# Patient Record
Sex: FEMALE | Race: WHITE | Employment: UNEMPLOYED | URBAN - METROPOLITAN AREA
[De-identification: names, ages, dates, MRNs, and addresses within clinical notes are randomized per-mention and may not be internally consistent; named-entity substitution may affect disease eponyms.]

---

## 2018-03-19 ENCOUNTER — HOSPITAL ENCOUNTER (OUTPATIENT)
Dept: GENERAL RADIOLOGY | Age: 58
Discharge: HOME | End: 2018-03-19

## 2018-03-19 DIAGNOSIS — M54.50 CHRONIC BILATERAL LOW BACK PAIN WITHOUT SCIATICA: ICD-10-CM

## 2018-03-19 DIAGNOSIS — G89.29 CHRONIC BILATERAL LOW BACK PAIN WITHOUT SCIATICA: ICD-10-CM

## 2018-03-19 DIAGNOSIS — M54.6 CHRONIC BILATERAL THORACIC BACK PAIN: ICD-10-CM

## 2018-03-19 DIAGNOSIS — G89.29 CHRONIC BILATERAL THORACIC BACK PAIN: ICD-10-CM

## 2018-04-13 ENCOUNTER — HOSPITAL ENCOUNTER (OUTPATIENT)
Dept: MAMMOGRAPHY | Age: 58
Discharge: HOME | End: 2018-04-13
Attending: FAMILY MEDICINE | Admitting: FAMILY MEDICINE

## 2018-04-13 DIAGNOSIS — Z12.31 VISIT FOR SCREENING MAMMOGRAM: ICD-10-CM

## 2018-05-29 ENCOUNTER — OFFICE VISIT (OUTPATIENT)
Dept: ORTHOPEDIC SURGERY | Age: 58
End: 2018-05-29

## 2018-05-29 VITALS
HEIGHT: 62 IN | DIASTOLIC BLOOD PRESSURE: 70 MMHG | BODY MASS INDEX: 24.83 KG/M2 | WEIGHT: 134.92 LBS | SYSTOLIC BLOOD PRESSURE: 150 MMHG

## 2018-05-29 DIAGNOSIS — M51.36 DDD (DEGENERATIVE DISC DISEASE), LUMBAR: ICD-10-CM

## 2018-05-29 DIAGNOSIS — R20.2 PARESTHESIA AND PAIN OF BOTH UPPER EXTREMITIES: ICD-10-CM

## 2018-05-29 DIAGNOSIS — M48.062 LUMBAR STENOSIS WITH NEUROGENIC CLAUDICATION: Primary | ICD-10-CM

## 2018-05-29 DIAGNOSIS — M48.061 LUMBAR FORAMINAL STENOSIS: ICD-10-CM

## 2018-05-29 DIAGNOSIS — M79.601 PARESTHESIA AND PAIN OF BOTH UPPER EXTREMITIES: ICD-10-CM

## 2018-05-29 DIAGNOSIS — M79.602 PARESTHESIA AND PAIN OF BOTH UPPER EXTREMITIES: ICD-10-CM

## 2018-05-29 PROCEDURE — G8427 DOCREV CUR MEDS BY ELIG CLIN: HCPCS | Performed by: PHYSICAL MEDICINE & REHABILITATION

## 2018-05-29 PROCEDURE — 99243 OFF/OP CNSLTJ NEW/EST LOW 30: CPT | Performed by: PHYSICAL MEDICINE & REHABILITATION

## 2018-05-29 PROCEDURE — 3017F COLORECTAL CA SCREEN DOC REV: CPT | Performed by: PHYSICAL MEDICINE & REHABILITATION

## 2018-05-29 PROCEDURE — G8420 CALC BMI NORM PARAMETERS: HCPCS | Performed by: PHYSICAL MEDICINE & REHABILITATION

## 2018-05-29 RX ORDER — METHOCARBAMOL 500 MG/1
TABLET, FILM COATED ORAL
Status: ON HOLD | COMMUNITY
Start: 2018-05-17 | End: 2018-08-14

## 2018-05-29 RX ORDER — LISINOPRIL 10 MG/1
TABLET ORAL
Status: ON HOLD | COMMUNITY
Start: 2018-05-08 | End: 2018-08-14

## 2018-05-29 RX ORDER — ATORVASTATIN CALCIUM 20 MG/1
TABLET, FILM COATED ORAL
Status: ON HOLD | COMMUNITY
Start: 2018-05-08 | End: 2018-08-14

## 2018-05-29 RX ORDER — FLUTICASONE PROPIONATE 50 MCG
2 SPRAY, SUSPENSION (ML) NASAL
COMMUNITY
Start: 2018-05-08 | End: 2020-07-22 | Stop reason: ALTCHOICE

## 2018-05-29 RX ORDER — HYDROCHLOROTHIAZIDE 12.5 MG/1
12.5 TABLET ORAL DAILY
COMMUNITY
Start: 2018-05-08

## 2018-05-29 RX ORDER — MOMETASONE FUROATE 100 UG/1
AEROSOL RESPIRATORY (INHALATION)
Status: ON HOLD | COMMUNITY
Start: 2018-05-08 | End: 2018-10-30 | Stop reason: ALTCHOICE

## 2018-05-29 RX ORDER — AMITRIPTYLINE HYDROCHLORIDE 50 MG/1
50 TABLET, FILM COATED ORAL NIGHTLY
COMMUNITY
Start: 2018-05-17 | End: 2020-07-22 | Stop reason: ALTCHOICE

## 2018-05-29 RX ORDER — CETIRIZINE HYDROCHLORIDE 10 MG/1
10 TABLET ORAL NIGHTLY
COMMUNITY
Start: 2018-05-08

## 2018-05-30 ENCOUNTER — TELEPHONE (OUTPATIENT)
Dept: ORTHOPEDIC SURGERY | Age: 58
End: 2018-05-30

## 2018-06-07 ENCOUNTER — TELEPHONE (OUTPATIENT)
Dept: ORTHOPEDIC SURGERY | Age: 58
End: 2018-06-07

## 2018-06-15 ENCOUNTER — OFFICE VISIT (OUTPATIENT)
Dept: ORTHOPEDIC SURGERY | Age: 58
End: 2018-06-15

## 2018-06-15 DIAGNOSIS — G56.03 BILATERAL CARPAL TUNNEL SYNDROME: Primary | ICD-10-CM

## 2018-06-15 PROCEDURE — 95910 NRV CNDJ TEST 7-8 STUDIES: CPT | Performed by: PHYSICAL MEDICINE & REHABILITATION

## 2018-06-15 PROCEDURE — 95886 MUSC TEST DONE W/N TEST COMP: CPT | Performed by: PHYSICAL MEDICINE & REHABILITATION

## 2018-06-19 ENCOUNTER — TELEPHONE (OUTPATIENT)
Dept: ORTHOPEDIC SURGERY | Age: 58
End: 2018-06-19

## 2018-06-19 ENCOUNTER — OFFICE VISIT (OUTPATIENT)
Dept: ORTHOPEDIC SURGERY | Age: 58
End: 2018-06-19

## 2018-06-19 VITALS
HEIGHT: 62 IN | DIASTOLIC BLOOD PRESSURE: 72 MMHG | WEIGHT: 180 LBS | SYSTOLIC BLOOD PRESSURE: 130 MMHG | BODY MASS INDEX: 33.13 KG/M2

## 2018-06-19 DIAGNOSIS — M51.26 LUMBAR DISC HERNIATION: Primary | ICD-10-CM

## 2018-06-19 DIAGNOSIS — M54.16 LUMBAR RADICULOPATHY: ICD-10-CM

## 2018-06-19 DIAGNOSIS — G56.01 RIGHT CARPAL TUNNEL SYNDROME: ICD-10-CM

## 2018-06-19 PROCEDURE — G8417 CALC BMI ABV UP PARAM F/U: HCPCS | Performed by: PHYSICAL MEDICINE & REHABILITATION

## 2018-06-19 PROCEDURE — 1036F TOBACCO NON-USER: CPT | Performed by: PHYSICAL MEDICINE & REHABILITATION

## 2018-06-19 PROCEDURE — G8427 DOCREV CUR MEDS BY ELIG CLIN: HCPCS | Performed by: PHYSICAL MEDICINE & REHABILITATION

## 2018-06-19 PROCEDURE — 99213 OFFICE O/P EST LOW 20 MIN: CPT | Performed by: PHYSICAL MEDICINE & REHABILITATION

## 2018-06-19 PROCEDURE — 3017F COLORECTAL CA SCREEN DOC REV: CPT | Performed by: PHYSICAL MEDICINE & REHABILITATION

## 2018-07-16 ENCOUNTER — HOSPITAL ENCOUNTER (OUTPATIENT)
Dept: PHYSICAL THERAPY | Age: 58
Setting detail: THERAPIES SERIES
Discharge: HOME | End: 2018-07-16
Payer: COMMERCIAL

## 2018-07-16 PROCEDURE — G8978 MOBILITY CURRENT STATUS: HCPCS | Performed by: PHYSICAL THERAPIST

## 2018-07-16 PROCEDURE — G8979 MOBILITY GOAL STATUS: HCPCS | Performed by: PHYSICAL THERAPIST

## 2018-07-16 PROCEDURE — 97110 THERAPEUTIC EXERCISES: CPT | Performed by: PHYSICAL THERAPIST

## 2018-07-16 PROCEDURE — 97161 PT EVAL LOW COMPLEX 20 MIN: CPT | Performed by: PHYSICAL THERAPIST

## 2018-07-16 NOTE — FLOWSHEET NOTE
stated goal: walk for ex without pain     Therapist goals for Patient:   Short Term Goals: To be achieved in: 2 weeks  1. Independent in HEP and progression per patient tolerance, in order to prevent re-injury. 2. Patient will have a decrease in pain to facilitate improvement in movement, function, and ADLs as indicated by Functional Deficits.     Long Term Goals: To be achieved in: 12 weeks  1. Disability index score of 19% or less for the LITTLE to assist with reaching prior level of function. 2. Patient will demonstrate increased AROM to WNL, good LS mobility, good hip ROM to allow for proper joint functioning as indicated by patients Functional Deficits. 3. Patient will demonstrate an increase in Strength to good proximal hip and core activation to allow for proper functional mobility as indicated by patients Functional Deficits. 4. Patient will return to full functional activities without increased symptoms or restriction. 5. Walk 30 min without increased pain 90% of time(patient specific functional goal)                   Progression Towards Functional goals:  [] Patient is progressing as expected towards functional goals listed. [] Progression is slowed due to complexities listed. [] Progression has been slowed due to co-morbidities.   [x] Plan just implemented, too soon to assess goals progression  [] Other:     ASSESSMENT:  See eval    Treatment/Activity Tolerance:  [x] Patient tolerated treatment well [] Patient limited by fatique  [] Patient limited by pain  [] Patient limited by other medical complications  [] Other:     Prognosis: [x] Good [] Fair  [] Poor    Patient Requires Follow-up: [x] Yes  [] No    PLAN: See eval  [] Continue per plan of care [] Alter current plan (see comments)  [x] Plan of care initiated [] Hold pending MD visit [] Discharge    Electronically signed by: Hamilton Jones PT

## 2018-07-16 NOTE — PLAN OF CARE
[address] [place] [address],[address] [facility] [address] [place] [address], [address] [place] [address] [address]  Phone: [phone]   Fax:     [phone]     Carmina Salvador    Dear  Dr Lisandro Simmons,    We had the pleasure of evaluating the following patient for physical therapy services at [address] [place] [address]. A summary of our findings can be found in the initial assessment below. This includes our plan of care. If you have any questions or concerns regarding these findings, please do not hesitate to contact me at the office phone number checked above. Thank you for the referral.       Physician Signature:_______________________________Date:__________________  By signing above (or electronic signature), therapists plan is approved by physician      Patient: Rodrigo Buenrostro   : 1960   MRN: [id number]  Referring Physician:  Lisandro Simmons      Evaluation Date: 2018      Medical Diagnosis Information:   M51.26 lumbar herniation,  M54.16 lumbar radic     M54.5 LBP                                        Insurance information:  [facility] comm     Precautions/ Contra-indications: none  Latex Allergy:  [x]NO      []YES  Preferred Language for Healthcare:   [x]English       []other:    SUBJECTIVE: Patient stated complaint:L4-5 herniated pinching her sciatica. Going to have injections as soon as she gets the appt scheduled. Insidious LBP about 6 mos ago and has good and bad days. Relevant Medical History:  Functional Disability Index/G-Codes:  PT G-Codes  Functional Assessment Tool Used: LITTLE  Score: 40%  Functional Limitation: Mobility: Walking and moving around  Mobility: Walking and Moving Around Current Status (): At least 40 percent but less than 60 percent impaired, limited or restricted  Mobility: Walking and Moving Around Goal Status ():  At least 1 percent but less than 20 percent impaired, limited or restricted    Pain Scale: 6/10  Easing factors: nothing  Provocative factors: everything     Type: [x]Constant   []Intermittent  []Radiating []Localized []other:     Numbness/Tingling: N/T both feet    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    Living Status/Prior Level of Function: Independent with ADLs and IADLs,     OBJECTIVE:     ROM  Comments   Lumbar Flex     Lumbar Ext       MMT LEFT RIGHT Comments   Lumbar Side Bend      Lumbar Rotation      Hip Flexion 5/5 4/5 pain    Hip Abd      Hip ER      Hip IR      Hip Extension      Knee Ext 5/5 5/5    Knee Flex 5/5 5/5    Hamstring Flex 5/5 5/5    Piriformis                    ROM LEFT RIGHT Comments   Multifidus      Transverse Ab      Hip Flexors      Hip Abductors      Hip Extensors      Hip IR equal              Myotomes Normal Abnormal Comments   Hip flexion (L1-L2)      Knee extension (L2-L4)      Dorsiflexion (L4-L5)      Great Toe Ext (L5)      Ankle Eversion (S1-S2)      Ankle PF(S1-S2)        Dermatomes Normal Abnormal Comments   inguinal area (L1)       anterior mid-thigh (L2)      distal ant thigh/med knee (L3)      medial lower leg and foot (L4)      lateral lower leg and foot (L5)      posterior calf (S1)      medial calcaneus (S2)        Neural dynamic tension testing Normal Abnormal Comments   Slump Test  - Degree of knee flexion:       SLR       0-30      30-70      Femoral nerve (L2-4)        Reflexes Normal Abnormal Comments   S1-2 Seated achilles x     S1-2 Prone knee bend      L3-4 Patellar tendon x     C5-6 Biceps      C6 Brachioradialis      C7-8 Triceps      Clonus      Babinski      Wagner's        Joint mobility:    []Normal    []Hypo   []Hyper    Palpation:     Functional Mobility/Transfers: independent     Posture: good    Gait: (include devices/WB status) normal    Bandages/Dressings/Incisions: NA    Orthopedic Special Tests:    Normal Abnormal N/A Comments   Toe walk         Heel Walk       Fwd Bend-aberrant or innominate mvmt)       Trendelenburg       Kemps/Quadrant       Ingridk       SALLIE/Jose       Hip scour       SLR       Crossed SLR       Supine to sit       Hip thrust       SI distraction/compression       PA/Spring       Prone Instability test       Prone knee bend       Sacral Spring/thrust                  [x] Patient history, allergies, meds reviewed. Medical chart reviewed. See intake form. Review Of Systems (ROS):  [x]Performed Review of systems (Integumentary, CardioPulmonary, Neurological) by intake and observation. Intake form has been scanned into medical record. Patient has been instructed to contact their primary care physician regarding ROS issues if not already being addressed at this time.       Co-morbidities/Complexities (which will affect course of rehabilitation):   [x]None           Arthritic conditions   []Rheumatoid arthritis (M05.9)  []Osteoarthritis (M19.91)   Cardiovascular conditions   []Hypertension (I10)  []Hyperlipidemia (E78.5)  []Angina pectoris (I20)  []Atherosclerosis (I70)   Musculoskeletal conditions   []Disc pathology   []Congenital spine pathologies   []Prior surgical intervention  []Osteoporosis (M81.8)  []Osteopenia (M85.8)   Endocrine conditions   []Hypothyroid (E03.9)  []Hyperthyroid Gastrointestinal conditions   []Constipation (N66.10)   Metabolic conditions   []Morbid obesity (E66.01)  []Diabetes type 1(E10.65) or 2 (E11.65)   []Neuropathy (G60.9)     Pulmonary conditions   []Asthma (J45)  []Coughing   []COPD (J44.9)   Psychological Disorders  []Anxiety (F41.9)  []Depression (F32.9)   []Other:   []Other:           Barriers to/and or personal factors that will affect rehab potential:              []Age  []Sex              []Motivation/Lack of Motivation                        []Co-Morbidities              []Cognitive Function, education/learning barriers              []Environmental, home barriers              []profession/work barriers  []past PT/medical management activities   []Reduced participation in work activities   [x]Reduced participation in social activities. [x]Reduced participation in sport/recreational activities. Classification:   [x]Signs/symptoms consistent with Lumbar instability/stabilization subgroup. []Signs/symptoms consistent with Lumbar mobilization/manipulation subgroup, myotomes and dermatomes intact. Meets manipulation criteria. []Signs/symptoms consistent with Lumbar direction specific/centralization subgroup   []Signs/symptoms consistent with Lumbar traction subgroup     []Signs/symptoms consistent with lumbar facet dysfunction   []Signs/symptoms consistent with lumbar stenosis type dysfunction   [x]Signs/symptoms consistent with nerve root involvement including myotome & dermatome dysfunction   []Signs/symptoms consistent with post-surgical status including: decreased ROM, strength and function.    []signs/symptoms consistent with pathology which may benefit from Dry needling     []other:      Prognosis/Rehab Potential:      []Excellent   [x]Good    []Fair   []Poor    Tolerance of evaluation/treatment:    []Excellent   [x]Good    []Fair   []Poor     Physical Therapy Evaluation Complexity Justification  [] A history of present problem with:  [x] no personal factors and/or comorbidities that impact the plan of care;  []1-2 personal factors and/or comorbidities that impact the plan of care  []3 personal factors and/or comorbidities that impact the plan of care  [] An examination of body systems using standardized tests and measures addressing any of the following: body structures and functions (impairments), activity limitations, and/or participation restrictions;:  [] a total of 1-2 or more elements   [x] a total of 3 or more elements   [] a total of 4 or more elements   [] A clinical presentation with:  [x] stable and/or uncomplicated characteristics   [] evolving clinical presentation with changing characteristics  [] unstable proper functional mobility as indicated by patients Functional Deficits. 4. Patient will return to full functional activities without increased symptoms or restriction.    5. Walk 30 min without increased pain 90% of time(patient specific functional goal)       Electronically signed by:  Mar Bryant PT

## 2018-07-23 ENCOUNTER — HOSPITAL ENCOUNTER (OUTPATIENT)
Dept: PHYSICAL THERAPY | Age: 58
Setting detail: THERAPIES SERIES
Discharge: HOME | End: 2018-07-23
Payer: COMMERCIAL

## 2018-07-23 PROCEDURE — 97110 THERAPEUTIC EXERCISES: CPT

## 2018-07-30 ENCOUNTER — APPOINTMENT (OUTPATIENT)
Dept: PHYSICAL THERAPY | Age: 58
End: 2018-07-30
Payer: COMMERCIAL

## 2018-08-03 ENCOUNTER — TELEPHONE (OUTPATIENT)
Dept: ORTHOPEDIC SURGERY | Age: 58
End: 2018-08-03

## 2018-08-14 ENCOUNTER — HOSPITAL ENCOUNTER (OUTPATIENT)
Age: 58
Setting detail: OUTPATIENT SURGERY
Discharge: HOME | End: 2018-08-14
Attending: PHYSICAL MEDICINE & REHABILITATION | Admitting: PHYSICAL MEDICINE & REHABILITATION
Payer: COMMERCIAL

## 2018-08-14 VITALS
TEMPERATURE: 97.1 F | WEIGHT: 185 LBS | SYSTOLIC BLOOD PRESSURE: 126 MMHG | DIASTOLIC BLOOD PRESSURE: 77 MMHG | OXYGEN SATURATION: 99 % | RESPIRATION RATE: 16 BRPM | BODY MASS INDEX: 34.04 KG/M2 | HEART RATE: 67 BPM | HEIGHT: 62 IN

## 2018-08-14 PROCEDURE — 2709999900 HC NON-CHARGEABLE SUPPLY: Performed by: PHYSICAL MEDICINE & REHABILITATION

## 2018-08-14 PROCEDURE — 3600000002 HC SURGERY LEVEL 2 BASE: Performed by: PHYSICAL MEDICINE & REHABILITATION

## 2018-08-14 PROCEDURE — 7100000010 HC PHASE II RECOVERY - FIRST 15 MIN: Performed by: PHYSICAL MEDICINE & REHABILITATION

## 2018-08-14 RX ORDER — CYCLOBENZAPRINE HCL 10 MG
10 TABLET ORAL 3 TIMES DAILY PRN
Status: ON HOLD | COMMUNITY
End: 2018-10-30 | Stop reason: ALTCHOICE

## 2018-08-14 ASSESSMENT — PAIN SCALES - GENERAL
PAINLEVEL_OUTOF10: 0
PAINLEVEL_OUTOF10: 0

## 2018-08-14 ASSESSMENT — PAIN - FUNCTIONAL ASSESSMENT: PAIN_FUNCTIONAL_ASSESSMENT: 0-10

## 2018-08-14 NOTE — H&P
HISTORY AND PHYSICAL/PRE-SEDATION ASSESSMENT    Patient:  Luci Uriostegui   :  1960  Medical Record No.:  [id number]   Date:  2018  Physician:  Salvatore Heath M.D. Facility: [facility]     Nursing History and Physical reviewed and agreed upon. Additional findings:    Allergies:  Patient has no known allergies. Home Medications:    Prior to Admission medications    Medication Sig Start Date End Date Taking? Authorizing Provider   cyclobenzaprine (FLEXERIL) 10 MG tablet Take 10 mg by mouth 3 times daily as needed for Muscle spasms   Yes Historical Provider, MD   Naproxen Sodium (ALEVE PO) Take by mouth   Yes Historical Provider, MD   hydrochlorothiazide (HYDRODIURIL) 12.5 MG tablet  18  Yes Historical Provider, MD   fluticasone (FLONASE) 50 MCG/ACT nasal spray  18  Yes Historical Provider, MD   amitriptyline (ELAVIL) 50 MG tablet  18  Yes Historical Provider, MD   cetirizine (ZYRTEC) 10 MG tablet  18  Yes Historical Provider, MD   [facility]  MCG/ACT AERO inhaler  18  Yes Historical Provider, MD       Vitals: Stable       PHYSICAL EXAM:  HENT: Airway patent and reviewed  Cardiovascular: Normal rate, regular rhythm, normal heart sounds. Pulmonary/Chest: No wheezes. No rhonchi. No rales. Abdominal: Soft. Bowel sounds are normal. No distension. ASA CLASS:         []   I. Normal, healthy adult           [x]   II.  Mild systemic disease            []   III. Severe systemic disease      Sedation plan:   [x]  Local              []  Minimal                  []  General anesthesia    Patient's condition acceptable for planned procedure/sedation. Post Procedure Plan   Return to same level of care   ______________________     The risks and benefits as well as alternatives to the procedure have been discussed with the patient and or family. The patient and or next of kin understands and agrees to proceed.     Salvatore Heath M.D.

## 2018-09-11 ENCOUNTER — OFFICE VISIT (OUTPATIENT)
Dept: ORTHOPEDIC SURGERY | Age: 58
End: 2018-09-11

## 2018-09-11 ENCOUNTER — TELEPHONE (OUTPATIENT)
Dept: ORTHOPEDIC SURGERY | Age: 58
End: 2018-09-11

## 2018-09-11 VITALS
HEIGHT: 63 IN | WEIGHT: 180 LBS | SYSTOLIC BLOOD PRESSURE: 126 MMHG | DIASTOLIC BLOOD PRESSURE: 75 MMHG | BODY MASS INDEX: 31.89 KG/M2

## 2018-09-11 DIAGNOSIS — M54.16 LUMBAR RADICULOPATHY: ICD-10-CM

## 2018-09-11 DIAGNOSIS — M51.26 HNP (HERNIATED NUCLEUS PULPOSUS), LUMBAR: Primary | ICD-10-CM

## 2018-09-11 DIAGNOSIS — M48.061 LUMBAR FORAMINAL STENOSIS: ICD-10-CM

## 2018-09-11 PROCEDURE — 1036F TOBACCO NON-USER: CPT | Performed by: PHYSICAL MEDICINE & REHABILITATION

## 2018-09-11 PROCEDURE — 3017F COLORECTAL CA SCREEN DOC REV: CPT | Performed by: PHYSICAL MEDICINE & REHABILITATION

## 2018-09-11 PROCEDURE — 99214 OFFICE O/P EST MOD 30 MIN: CPT | Performed by: PHYSICAL MEDICINE & REHABILITATION

## 2018-09-11 PROCEDURE — G8417 CALC BMI ABV UP PARAM F/U: HCPCS | Performed by: PHYSICAL MEDICINE & REHABILITATION

## 2018-09-11 PROCEDURE — G8427 DOCREV CUR MEDS BY ELIG CLIN: HCPCS | Performed by: PHYSICAL MEDICINE & REHABILITATION

## 2018-09-11 RX ORDER — METHYLPREDNISOLONE 4 MG/1
TABLET ORAL
Qty: 1 KIT | Refills: 0 | Status: SHIPPED | OUTPATIENT
Start: 2018-09-11 | End: 2018-09-17

## 2018-09-11 NOTE — PROGRESS NOTES
Follow up: [address] [place] [address]  1960  [id number]      CHIEF COMPLAINT:    Chief Complaint   Patient presents with    Lower Back Pain     f/u Right L5 & Left L5 TF 08/14/18, states she had complete pain relief x3 weeks, her pain has flared up over the last week on the left side          HISTORY OF PRESENT ILLNESS:  Ms. Rebel Ortega is a 56 y.o. female returns for a follow up visit for multiple medical problems. Her current presenting problems are   1. HNP (herniated nucleus pulposus), lumbar    2. Lumbar foraminal stenosis    3. Lumbar radiculopathy    . As per information/history obtained from the PADT(patient assessment and documentation tool) - She complains of pain in the lower back with radiation to the upper leg Left She rates the pain 6/10 and describes it as dull, aching. Pain is made worse by: nothing. She denies side effects from the current pain regimen. Patient reports that since the last follow up visit the physical functioning is unchanged, family/social relationships are unchanged, mood is unchanged and sleep patterns are unchanged, and that the overall functioning is unchanged. Patient denies neurological bowel or bladder. She returns after undergoing bilateral L5 transforaminal epidural steroid injections about 1 month ago. This gave her about 3 weeks of pain relief. She states that she has been increasing her activity quite a bit. She states she has completed things she hasn't done in years. Since last one week she has developed left leg pain radiating from the hip into the ankle. She finds it difficult to stand and walk due to her left leg pain. She would like to repeat an epidural steroid injection.   She is only taking anti-inflammatories at this point        Associated signs and symptoms:   Neurogenic bowel or bladder symptoms:  no   Perceived weakness:  yes   Difficulty walking:  yes              Past Medical History:   Past Medical History:   Diagnosis Date    Depression       Past Surgical History:     Past Surgical History:   Procedure Laterality Date     SECTION      HYSTERECTOMY      GA NJX DX/THER SBST INTRLMNR CRV/THRC W/IMG GDN Bilateral 2018    BILATERAL LUMBAR FIVE TRANSFORAMINAL EPIDURAL STEROID INJECTION SITE CONFIRMED BY FLUOROSCOPY performed by Abbie Mcgraw MD at [place] [address]     Current Medications:     Current Outpatient Prescriptions:     cyclobenzaprine (FLEXERIL) 10 MG tablet, Take 10 mg by mouth 3 times daily as needed for Muscle spasms, Disp: , Rfl:     Naproxen Sodium (ALEVE PO), Take by mouth, Disp: , Rfl:     hydrochlorothiazide (HYDRODIURIL) 12.5 MG tablet, , Disp: , Rfl:     fluticasone (FLONASE) 50 MCG/ACT nasal spray, , Disp: , Rfl:     amitriptyline (ELAVIL) 50 MG tablet, , Disp: , Rfl:     cetirizine (ZYRTEC) 10 MG tablet, , Disp: , Rfl:     ASMANEX  MCG/ACT AERO inhaler, , Disp: , Rfl:   Allergies:  Patient has no known allergies. Social History:    reports that she has quit smoking. She has never used smokeless tobacco. She reports that she does not drink alcohol or use drugs. Family History:   History reviewed. No pertinent family history. REVIEW OF SYSTEMS:   CONSTITUTIONAL: Denies unexplained weight loss, fevers, chills or fatigue  NEUROLOGICAL: Denies unsteady gait or progressive weakness  MUSCULOSKELETAL: Denies joint swelling or redness  GI: Denies nausea, vomiting, diarrhea   : Denies bowel or bladder issues       PHYSICAL EXAM:    Vitals: Blood pressure 126/75, height 5' 3\" (1.6 m), weight 180 lb (81.6 kg), not currently breastfeeding. GENERAL EXAM:  · General Apparence: Patient is adequately groomed with no evidence of malnutrition. · Psychiatric: Orientation: The patient is oriented to time, place and person. The patient's mood and affect are appropriate   · Vascular: Examination reveals no swelling and palpation reveals no tenderness in upper or lower extremities.  Good capillary refill. · The lymphatic examination of the neck, axillae and groin reveals all areas to be without enlargement or induration  · Sensation is intact without deficit in the upper and lower extremities to light touch and pinprick  · Coordination of the upper and lower extremities are normal.  · Additional Examinations:  · RIGHT UPPER EXTREMITY:  Inspection/examination of the right upper extremity does not show any tenderness, deformity or injury. Range of motion is unremarkable and pain-free. There is no gross instability. There are no rashes, ulcerations or lesions. Strength and tone are normal. No atrophy or abnormal movements are noted. · LEFT UPPER EXTREMITY: Inspection/examination of the left upper extremity does not show any tenderness, deformity or injury. Range of motion is unremarkable and pain-free. There is no gross instability. There are no rashes, ulcerations or lesions. Strength and tone are normal. No atrophy or abnormal movements are noted. LUMBAR/SACRAL EXAMINATION:  · Inspection: Local inspection shows no step-off or bruising. Lumbar alignment is normal. No instability is noted. · Palpation:   No evidence of tenderness at the midline. Positive for tenderness palpation over the left lumbar paraspinals. There is no paraspinal spasm. · Range of Motion: limited by 50% in all planes due to pain  · Strength:   Strength testing is 5/5 in all muscle groups tested. · Special Tests:   Straight leg raise + on left and crossed SLR negative. · Skin: There are no rashes, ulcerations or lesions. · Reflexes: Reflexes are symmetrically 2+ at the patellar and ankle tendons. Clonus absent bilaterally at the feet. · Gait & station: antalgic on the left  · Additional Examinations:  · RIGHT LOWER EXTREMITY: Inspection/examination of the right lower extremity does not show any tenderness, deformity or injury. Range of motion is normal and pain-free. There is no gross instability.   There are no rashes, ulcerations or lesions. Strength and tone are normal. No atrophy or abnormal movements are noted. · LEFT LOWER EXTREMITY:  Inspection/examination of the left lower extremity does not show any tenderness, deformity or injury. Range of motion is normal and pain-free. There is no gross instability. There are no rashes, ulcerations or lesions. Strength and tone are normal. No atrophy or abnormal movements are noted. Diagnostic Testing:    MR Lumbar spine shows 6/4/18  1. Left paracentral/foraminal protrusion with mild resultant central canal stenosis L4-5 level.     Moderate left and mild right foraminal narrowing compresses the exiting L5 nerve root in    combination with facet arthropathy. 2. Mild central canal stenosis L3-4 level with inferior foraminal narrowing without nerve root    effacement. No results found for this or any previous visit. Impression:       1. HNP (herniated nucleus pulposus), lumbar    2. Lumbar foraminal stenosis    3. Lumbar radiculopathy        Plan:  Clinical Course: Above diagnoses are worsening    1. Medications:  I will prescribe a medrol dose pack to help with the inflammatory component of pain. Risks, benefits and alternatives were discussed. Recommended to stop any NSAIDs to reduce risk of GI bleed during the course of the dose pack. 2. PT:  Encouraged to continue with HEP. 3. Further studies:  No further studies. 4. Interventional:  We discussed pursuing a Left L4 and L5 TF epidural steroid injection to address the pain. Radiologic imaging and symptoms confirm the pain etiology. Risks, benefits and alternatives of interventional options were discussed. These include and are not limited to bleeding, infection, spinal headache, nerve injury and lack of pain relief. The patient verbalized understanding and would like to proceed. The patient will be scheduled accordingly. 5. Follow up:  4-6 weeks          Paola Meyers MD, [facility], [facility]  Board Certified in

## 2018-09-12 ENCOUNTER — TELEPHONE (OUTPATIENT)
Dept: ORTHOPEDIC SURGERY | Age: 58
End: 2018-09-12

## 2018-09-12 NOTE — TELEPHONE ENCOUNTER
Auth: WILLIAM  Reference #: [id number]  Date: 9/25/18  CPT: 66840, 80369, [id number], 12912  DX: M51.26, M99.83, M54.16   Outpatient  Procedure: TUAN  SX Location: [address]: [address] [place] [address]  Physician: NELSON

## 2018-09-20 ENCOUNTER — OFFICE VISIT (OUTPATIENT)
Dept: ORTHOPEDIC SURGERY | Age: 58
End: 2018-09-20

## 2018-09-20 ENCOUNTER — TELEPHONE (OUTPATIENT)
Dept: ORTHOPEDIC SURGERY | Age: 58
End: 2018-09-20

## 2018-09-20 VITALS
DIASTOLIC BLOOD PRESSURE: 75 MMHG | WEIGHT: 180 LBS | HEIGHT: 63 IN | SYSTOLIC BLOOD PRESSURE: 126 MMHG | BODY MASS INDEX: 31.89 KG/M2

## 2018-09-20 DIAGNOSIS — M51.26 HNP (HERNIATED NUCLEUS PULPOSUS), LUMBAR: Primary | ICD-10-CM

## 2018-09-20 DIAGNOSIS — R29.898 LEFT LEG WEAKNESS: ICD-10-CM

## 2018-09-20 DIAGNOSIS — M48.062 SPINAL STENOSIS OF LUMBAR REGION WITH NEUROGENIC CLAUDICATION: ICD-10-CM

## 2018-09-20 DIAGNOSIS — M51.36 DDD (DEGENERATIVE DISC DISEASE), LUMBAR: ICD-10-CM

## 2018-09-20 PROCEDURE — 3017F COLORECTAL CA SCREEN DOC REV: CPT | Performed by: PHYSICAL MEDICINE & REHABILITATION

## 2018-09-20 PROCEDURE — G8427 DOCREV CUR MEDS BY ELIG CLIN: HCPCS | Performed by: PHYSICAL MEDICINE & REHABILITATION

## 2018-09-20 PROCEDURE — 99214 OFFICE O/P EST MOD 30 MIN: CPT | Performed by: PHYSICAL MEDICINE & REHABILITATION

## 2018-09-20 PROCEDURE — 1036F TOBACCO NON-USER: CPT | Performed by: PHYSICAL MEDICINE & REHABILITATION

## 2018-09-20 PROCEDURE — G8417 CALC BMI ABV UP PARAM F/U: HCPCS | Performed by: PHYSICAL MEDICINE & REHABILITATION

## 2018-09-20 RX ORDER — OXYCODONE HYDROCHLORIDE AND ACETAMINOPHEN 5; 325 MG/1; MG/1
1 TABLET ORAL 3 TIMES DAILY PRN
Qty: 21 TABLET | Refills: 0 | Status: SHIPPED | OUTPATIENT
Start: 2018-09-21 | End: 2018-09-28

## 2018-09-20 RX ORDER — METHYLPREDNISOLONE 4 MG/1
TABLET ORAL
Qty: 1 KIT | Refills: 0 | Status: SHIPPED | OUTPATIENT
Start: 2018-09-20 | End: 2018-09-26

## 2018-09-20 NOTE — PROGRESS NOTES
Follow up: [address] [place] [address]  1960  [id number]      CHIEF COMPLAINT:    Chief Complaint   Patient presents with    Lower Back Pain     F/u LSP. Patient just had Right L5 & Left L5 TF 8/14. She is scheduled for another TUAN on 9/25.  Leg Pain     New onset bilateral leg pain that began Friday. No new injuries or triggers for pain. States pain radiates just below knees. HISTORY OF PRESENT ILLNESS:  Ms. Bk Phipps is a 56 y.o. female returns for a follow up visit for multiple medical problems. Her current presenting problems are   1. HNP (herniated nucleus pulposus), lumbar    2. Spinal stenosis of lumbar region with neurogenic claudication    3. DDD (degenerative disc disease), lumbar    4. Left leg weakness    . As per information/history obtained from the PADT(patient assessment and documentation tool) - She complains of pain in the lower back with radiation to the upper leg Bilateral and knees Bilateral She rates the pain 10/10 and describes it as dull, aching, throbbing. Pain is made worse by: walking, standing. She denies side effects from the current pain regimen. Patient reports that since the last follow up visit the physical functioning is worse, family/social relationships are worse, mood is worse and sleep patterns are worse, and that the overall functioning is worse. Patient denies neurological bowel or bladder. She presents today with a one-week history of increase of severe back and bilateral leg pain. She has had bilateral L5 transforaminal epidural steroid injections performed on 8/14/2018. She did quite well following these injections. Over the course of the past 1 week she cannot identify any inciting event or any injury. She feels weak in her left leg. She denies having any bowel bladder dysfunction which is new. She has had long-standing intermittent urinary incontinence due to previous hysterectomy.   She feels her left leg is weak compared to her right leg.  But she feels her pain is equally as bad in her back and both legs. Her pain radiates into her knees and ankles. She finds difficult to walk and sleep as well. She has been to the ER twice now and received Percocet. She is requesting a refill of her Percocet. Associated signs and symptoms:   Neurogenic bowel or bladder symptoms:  no   Perceived weakness:  yes   Difficulty walking:  yes              Past Medical History:   Past Medical History:   Diagnosis Date    Depression       Past Surgical History:     Past Surgical History:   Procedure Laterality Date     SECTION      HYSTERECTOMY      MT NJX DX/THER SBST INTRLMNR CRV/THRC W/IMG GDN Bilateral 2018    BILATERAL LUMBAR FIVE TRANSFORAMINAL EPIDURAL STEROID INJECTION SITE CONFIRMED BY FLUOROSCOPY performed by Flaquita Freitas MD at [place] [address]     Current Medications:     Current Outpatient Prescriptions:     [START ON 2018] oxyCODONE-acetaminophen (PERCOCET) 5-325 MG per tablet, Take 1 tablet by mouth 3 times daily as needed for Pain for up to 7 days. Tigist Maya Date: 18, Disp: 21 tablet, Rfl: 0    methylPREDNISolone (MEDROL, DARIEL,) 4 MG tablet, Take by mouth., Disp: 1 kit, Rfl: 0    cyclobenzaprine (FLEXERIL) 10 MG tablet, Take 10 mg by mouth 3 times daily as needed for Muscle spasms, Disp: , Rfl:     Naproxen Sodium (ALEVE PO), Take by mouth, Disp: , Rfl:     hydrochlorothiazide (HYDRODIURIL) 12.5 MG tablet, , Disp: , Rfl:     fluticasone (FLONASE) 50 MCG/ACT nasal spray, , Disp: , Rfl:     amitriptyline (ELAVIL) 50 MG tablet, , Disp: , Rfl:     cetirizine (ZYRTEC) 10 MG tablet, , Disp: , Rfl:     ASMANEX  MCG/ACT AERO inhaler, , Disp: , Rfl:   Allergies:  Patient has no known allergies. Social History:    reports that she has quit smoking. She has never used smokeless tobacco. She reports that she does not drink alcohol or use drugs. Family History:   History reviewed.  No pertinent family instability. There are no rashes, ulcerations or lesions. Strength and tone are normal. No atrophy or abnormal movements are noted. Diagnostic Testing:    MR Lumbar spine shows 6/4/18  1. Left paracentral/foraminal protrusion with mild resultant central canal stenosis L4-5 level.     Moderate left and mild right foraminal narrowing compresses the exiting L5 nerve root in    combination with facet arthropathy. 2. Mild central canal stenosis L3-4 level with inferior foraminal narrowing without nerve root    effacement. 3. Please refer to the body of the report for level by level disc space analysis. No results found for this or any previous visit. Impression:       1. HNP (herniated nucleus pulposus), lumbar    2. Spinal stenosis of lumbar region with neurogenic claudication    3. DDD (degenerative disc disease), lumbar    4. Left leg weakness        Plan:  Clinical Course: above diagnoses are worsening    1. Medications:  I will prescribe a medrol dose pack to help with the inflammatory component of pain. Risks, benefits and alternatives were discussed. Recommended to stop any NSAIDs to reduce risk of GI bleed during the course of the dose pack. OARRS reviewed and appropriate. Low risk on ORT. Continue current regimen of Percocet 5/325 mg po tid #21. Informed verbal consent was obtained. Goals of the current treatment regimen include: improvement in pain, improvement in overall in physical performance, ability to perform daily activities, work or disability, emotional distress, health care utilization and decreased medication consumption. Progress will be monitored towards achieving/maintaining therapeutic goals:    1. Improving perceived interference of pain with ADL's, ability to perform HEP, continue to improve in overall flexibility, ROM, strength and endurance, ability to do household activities indoor and/or outdoor, work and social/leisure activities.  Improve psychosocial and physical functioning. 2. Improving sleep to 6-7 hours a night. Improve mood/ anxiety and depression symptoms    3. Reduction of reliance on opioid analgesia/more appropriate opioid use. Utilization of adjuvant medications as appropriate. Risks and benefits of the medications and alternative treatments have been discussed with the patient. The patient was advised against drinking alcohol with the opioid pain medicines, advised against driving or handling machinery when starting or adjusting the dose of medicines, feeling groggy or drowsy, or if having any cognitive issues related to the current medications. The patient is fully aware of the risk of overdose and death, if medicines are misused and not taken as prescribed. If the patient develops new symptoms or if the symptoms worsen, the patient was told to call the office. 2. PT:  Hold on PT  3. Further studies:  MR Lumbar spine ASAP to evaluate for large disc herniation  4. Interventional:  Hold on lumbar TUAN  5. Follow up:  4-6 weeks          Paola Fatima MD, YUMI, [facility]  Board Certified in [address] [place] [address]  Certified and Fellowship Trained in [facility] ([facility])             This dictation was performed with a verbal recognition program [facility] ) and it was checked for errors. It is possible that there are still dictated errors within this office note. If so, please bring any errors to my attention for an addendum. All efforts were made to ensure that this office note is accurate.

## 2018-10-02 ENCOUNTER — TELEPHONE (OUTPATIENT)
Dept: ORTHOPEDIC SURGERY | Age: 58
End: 2018-10-02

## 2018-10-02 ENCOUNTER — OFFICE VISIT (OUTPATIENT)
Dept: ORTHOPEDIC SURGERY | Age: 58
End: 2018-10-02
Payer: COMMERCIAL

## 2018-10-02 VITALS — BODY MASS INDEX: 31.89 KG/M2 | HEIGHT: 63 IN | WEIGHT: 180 LBS

## 2018-10-02 DIAGNOSIS — M54.16 LUMBAR RADICULOPATHY: ICD-10-CM

## 2018-10-02 DIAGNOSIS — M47.816 SPONDYLOSIS OF LUMBAR REGION WITHOUT MYELOPATHY OR RADICULOPATHY: ICD-10-CM

## 2018-10-02 DIAGNOSIS — M51.26 HNP (HERNIATED NUCLEUS PULPOSUS), LUMBAR: Primary | ICD-10-CM

## 2018-10-02 PROCEDURE — G8417 CALC BMI ABV UP PARAM F/U: HCPCS | Performed by: PHYSICAL MEDICINE & REHABILITATION

## 2018-10-02 PROCEDURE — G8427 DOCREV CUR MEDS BY ELIG CLIN: HCPCS | Performed by: PHYSICAL MEDICINE & REHABILITATION

## 2018-10-02 PROCEDURE — 99214 OFFICE O/P EST MOD 30 MIN: CPT | Performed by: PHYSICAL MEDICINE & REHABILITATION

## 2018-10-02 PROCEDURE — 1036F TOBACCO NON-USER: CPT | Performed by: PHYSICAL MEDICINE & REHABILITATION

## 2018-10-02 PROCEDURE — 3017F COLORECTAL CA SCREEN DOC REV: CPT | Performed by: PHYSICAL MEDICINE & REHABILITATION

## 2018-10-02 PROCEDURE — G8484 FLU IMMUNIZE NO ADMIN: HCPCS | Performed by: PHYSICAL MEDICINE & REHABILITATION

## 2018-10-02 RX ORDER — OXYCODONE HYDROCHLORIDE AND ACETAMINOPHEN 5; 325 MG/1; MG/1
1 TABLET ORAL 3 TIMES DAILY PRN
Qty: 21 TABLET | Refills: 0 | Status: SHIPPED | OUTPATIENT
Start: 2018-10-02 | End: 2018-10-09

## 2018-10-02 NOTE — PROGRESS NOTES
Follow up: [address] [place] [address]  1960  [id number]      CHIEF COMPLAINT:    Chief Complaint   Patient presents with    Lower Back Pain     MRI LSP RESULTS         HISTORY OF PRESENT ILLNESS:  Ms. Conrado Padilla is a 56 y.o. female returns for a follow up visit for multiple medical problems. Her current presenting problems are   1. HNP (herniated nucleus pulposus), lumbar    2. Lumbar radiculopathy    3. Spondylosis of lumbar region without myelopathy or radiculopathy    . As per information/history obtained from the PADT(patient assessment and documentation tool) - She complains of pain in the lower back with radiation to the upper leg Left She rates the pain 8/10 and describes it as aching. Pain is made worse by: sitting, standing for long periods. She denies side effects from the current pain regimen. Patient reports that since the last follow up visit the physical functioning is unchanged, family/social relationships are unchanged, mood is unchanged and sleep patterns are unchanged, and that the overall functioning is unchanged. Patient denies neurological bowel or bladder. He presents today with worsening low back pain radiating to the bilateral lower extremities. She underwent an MRI of lumbar spine on 2018. She was given Percocet at her last evaluation and she is resting a refill. She reports her pain is quite severe and limits her ability to stand and walk. She underwent a lumbar epidural steroid injection in August with significant relief for a few weeks.             Associated signs and symptoms:   Neurogenic bowel or bladder symptoms:  no   Perceived weakness:  yes   Difficulty walking:  yes              Past Medical History:   Past Medical History:   Diagnosis Date    Depression       Past Surgical History:     Past Surgical History:   Procedure Laterality Date     SECTION      HYSTERECTOMY      RI NJX DX/THER SBST INTRLMNR CRV/THRC W/IMG GDN Bilateral 2018 machinery when starting or adjusting the dose of medicines, feeling groggy or drowsy, or if having any cognitive issues related to the current medications. The patient is fully aware of the risk of overdose and death, if medicines are misused and not taken as prescribed. If the patient develops new symptoms or if the symptoms worsen, the patient was told to call the office. RX Monitoring 11/4/2015   Documentation No signs of potential drug abuse or diversion identified. 2. PT:  Encouraged to continue with HEP. 3. Further studies:  Referral to spine surgery    4. Interventional:  We discussed pursuing a Right L3 and left L5 epidural steroid injection to address the pain. Radiologic imaging and symptoms confirm the pain etiology. Risks, benefits and alternatives of interventional options were discussed. These include and are not limited to bleeding, infection, spinal headache, nerve injury and lack of pain relief. The patient verbalized understanding and would like to proceed. The patient will be scheduled accordingly. 5. Follow up:  4-6 weeks      Patricia Cyr was instructed to call the office if her symptoms worsen or if new symptoms appear prior to the next scheduled visit. She is specifically instructed to contact the office between now & her scheduled appointment if she has concerns related to her condition or if she needs assistance in scheduling the above tests. She is welcome to call for an appointment sooner if she has any additional concerns or questions. Александр Vásquez. Gavin Sanchez MD, YUMI, [facility]  Board Certified in [address] [place] [address] Certified and Fellowship Trained in [facility] ([facility])             This dictation was performed with a verbal recognition program [facility]) and it was checked for errors. It is possible that there are still dictated errors within this office note.  If so, please bring

## 2018-10-02 NOTE — LETTER
Please schedule the following with:     Date:   1.10/16/2018 @ 7:45    2. 10/30/2018 @ 7:30     Account: [id number]  Patient: Marleni Blue    : 1960  Address:  [address]  [place]    Phone (H):  [phone] (home)      ----------------------------------------------------------------------------------------------  Diagnosis:     ICD-10-CM ICD-9-CM    1. HNP (herniated nucleus pulposus), lumbar M51.26 722.10 oxyCODONE-acetaminophen (PERCOCET) 5-325 MG per tablet      Ana Marcus MD (spine)   2. Lumbar radiculopathy M54.16 724.4 oxyCODONE-acetaminophen (PERCOCET) 5-325 MG per tablet      Ana Marcus MD (spine)   3. Spondylosis of lumbar region without myelopathy or radiculopathy M47.816 721.3 oxyCODONE-acetaminophen (PERCOCET) 5-325 MG per tablet      Ana Marcus MD (spine)         Levels: L3 RIGHT    L5 LEFT  Transforaminal TUAN  CPT Codes [id number], [id number]    ----------------------------------------------------------------------------------------------  Injection #   [id number]    Attending Physician       Beatriz Small MD.      ----------------------------------------------------------------------------------------------  Injection Scheduled For:    At:    [address]    Pre-Cert#    [employer]     Pre-Cert#    Comments:    · Infection control  · Tested positive for MRSA in past 12 months:  no  · Tested positive for MSSA \"staph infection\" in past 12 months: no  · Tested positive for VRE (Vancomycin Resistant Enterococci) in past 12 months:   no  · Currently on any antibiotics for an infection: no  · Anticoagulants:  · On a blood thinner:  no   · Any history of bleeding disorder: no   · Advanced Liver disease: no   · Advanced Renal disease: no   · Glaucoma: no   · Diabetes: no     Sedation:  No  -----------------------------------------------------------------------------------------------  No Known Allergies

## 2018-10-09 ENCOUNTER — TELEPHONE (OUTPATIENT)
Dept: ORTHOPEDIC SURGERY | Age: 58
End: 2018-10-09

## 2018-10-09 NOTE — TELEPHONE ENCOUNTER
Medication swab results from 10/02/2018 were received and are inappropriate. Per Dr. Mary Mckeon, due to the inappropriate medication swab results, she will continue to treat the patient but no longer prescribe opioid medications. Patient understood.

## 2018-10-16 ENCOUNTER — HOSPITAL ENCOUNTER (OUTPATIENT)
Age: 58
Setting detail: OUTPATIENT SURGERY
Discharge: HOME | End: 2018-10-16
Attending: PHYSICAL MEDICINE & REHABILITATION | Admitting: PHYSICAL MEDICINE & REHABILITATION
Payer: MEDICAID

## 2018-10-16 VITALS
SYSTOLIC BLOOD PRESSURE: 146 MMHG | RESPIRATION RATE: 16 BRPM | HEART RATE: 84 BPM | OXYGEN SATURATION: 95 % | WEIGHT: 170 LBS | TEMPERATURE: 97 F | BODY MASS INDEX: 30.12 KG/M2 | HEIGHT: 63 IN | DIASTOLIC BLOOD PRESSURE: 79 MMHG

## 2018-10-16 PROCEDURE — 6360000002 HC RX W HCPCS: Performed by: PHYSICAL MEDICINE & REHABILITATION

## 2018-10-16 PROCEDURE — 7100000010 HC PHASE II RECOVERY - FIRST 15 MIN: Performed by: PHYSICAL MEDICINE & REHABILITATION

## 2018-10-16 PROCEDURE — 3600000002 HC SURGERY LEVEL 2 BASE: Performed by: PHYSICAL MEDICINE & REHABILITATION

## 2018-10-16 PROCEDURE — 2709999900 HC NON-CHARGEABLE SUPPLY: Performed by: PHYSICAL MEDICINE & REHABILITATION

## 2018-10-16 PROCEDURE — 3600000012 HC SURGERY LEVEL 2 ADDTL 15MIN: Performed by: PHYSICAL MEDICINE & REHABILITATION

## 2018-10-16 PROCEDURE — 6360000004 HC RX CONTRAST MEDICATION: Performed by: PHYSICAL MEDICINE & REHABILITATION

## 2018-10-16 PROCEDURE — 2500000003 HC RX 250 WO HCPCS: Performed by: PHYSICAL MEDICINE & REHABILITATION

## 2018-10-16 RX ORDER — ALBUTEROL SULFATE 90 UG/1
2 AEROSOL, METERED RESPIRATORY (INHALATION) EVERY 6 HOURS PRN
COMMUNITY

## 2018-10-16 RX ORDER — IBUPROFEN 800 MG/1
800 TABLET ORAL EVERY 6 HOURS PRN
COMMUNITY
End: 2020-11-06

## 2018-10-16 ASSESSMENT — PAIN DESCRIPTION - DESCRIPTORS: DESCRIPTORS: THROBBING

## 2018-10-16 ASSESSMENT — PAIN - FUNCTIONAL ASSESSMENT: PAIN_FUNCTIONAL_ASSESSMENT: 0-10

## 2018-10-23 ENCOUNTER — TELEPHONE (OUTPATIENT)
Dept: ORTHOPEDIC SURGERY | Age: 58
End: 2018-10-23

## 2018-10-30 ENCOUNTER — HOSPITAL ENCOUNTER (OUTPATIENT)
Age: 58
Setting detail: OUTPATIENT SURGERY
Discharge: HOME | End: 2018-10-30
Attending: PHYSICAL MEDICINE & REHABILITATION | Admitting: PHYSICAL MEDICINE & REHABILITATION
Payer: MEDICAID

## 2018-10-30 VITALS
HEIGHT: 63 IN | OXYGEN SATURATION: 97 % | SYSTOLIC BLOOD PRESSURE: 144 MMHG | WEIGHT: 170 LBS | BODY MASS INDEX: 30.12 KG/M2 | HEART RATE: 67 BPM | TEMPERATURE: 97.5 F | RESPIRATION RATE: 16 BRPM | DIASTOLIC BLOOD PRESSURE: 83 MMHG

## 2018-10-30 PROCEDURE — 6360000004 HC RX CONTRAST MEDICATION: Performed by: PHYSICAL MEDICINE & REHABILITATION

## 2018-10-30 PROCEDURE — 2709999900 HC NON-CHARGEABLE SUPPLY: Performed by: PHYSICAL MEDICINE & REHABILITATION

## 2018-10-30 PROCEDURE — 6360000002 HC RX W HCPCS: Performed by: PHYSICAL MEDICINE & REHABILITATION

## 2018-10-30 PROCEDURE — 3600000002 HC SURGERY LEVEL 2 BASE: Performed by: PHYSICAL MEDICINE & REHABILITATION

## 2018-10-30 PROCEDURE — 2500000003 HC RX 250 WO HCPCS: Performed by: PHYSICAL MEDICINE & REHABILITATION

## 2018-10-30 PROCEDURE — 7100000010 HC PHASE II RECOVERY - FIRST 15 MIN: Performed by: PHYSICAL MEDICINE & REHABILITATION

## 2018-10-30 RX ORDER — METHOCARBAMOL 500 MG/1
500 TABLET, FILM COATED ORAL 4 TIMES DAILY
COMMUNITY
End: 2020-07-22 | Stop reason: ALTCHOICE

## 2018-10-30 RX ORDER — LIDOCAINE HYDROCHLORIDE 10 MG/ML
INJECTION, SOLUTION EPIDURAL; INFILTRATION; INTRACAUDAL; PERINEURAL PRN
Status: DISCONTINUED | OUTPATIENT
Start: 2018-10-30 | End: 2018-10-30 | Stop reason: HOSPADM

## 2018-10-30 ASSESSMENT — ACTIVITIES OF DAILY LIVING (ADL): EFFECT OF PAIN ON DAILY ACTIVITIES: ANY ACTIVITY

## 2018-10-30 ASSESSMENT — PAIN DESCRIPTION - DESCRIPTORS: DESCRIPTORS: ACHING

## 2018-10-30 ASSESSMENT — PAIN - FUNCTIONAL ASSESSMENT: PAIN_FUNCTIONAL_ASSESSMENT: 0-10

## 2018-12-11 ENCOUNTER — OFFICE VISIT (OUTPATIENT)
Dept: ORTHOPEDIC SURGERY | Age: 58
End: 2018-12-11
Payer: MEDICAID

## 2018-12-11 VITALS
SYSTOLIC BLOOD PRESSURE: 130 MMHG | HEART RATE: 80 BPM | DIASTOLIC BLOOD PRESSURE: 78 MMHG | HEIGHT: 63 IN | BODY MASS INDEX: 30.12 KG/M2 | WEIGHT: 169.97 LBS

## 2018-12-11 DIAGNOSIS — M51.36 LUMBAR DEGENERATIVE DISC DISEASE: Primary | ICD-10-CM

## 2018-12-11 PROCEDURE — G8427 DOCREV CUR MEDS BY ELIG CLIN: HCPCS | Performed by: ORTHOPAEDIC SURGERY

## 2018-12-11 PROCEDURE — 99243 OFF/OP CNSLTJ NEW/EST LOW 30: CPT | Performed by: ORTHOPAEDIC SURGERY

## 2018-12-11 PROCEDURE — G8484 FLU IMMUNIZE NO ADMIN: HCPCS | Performed by: ORTHOPAEDIC SURGERY

## 2018-12-11 PROCEDURE — G8417 CALC BMI ABV UP PARAM F/U: HCPCS | Performed by: ORTHOPAEDIC SURGERY

## 2018-12-11 PROCEDURE — 3017F COLORECTAL CA SCREEN DOC REV: CPT | Performed by: ORTHOPAEDIC SURGERY

## 2018-12-11 NOTE — PROGRESS NOTES
History of present illness:   Ms. Alka Thrasher  is a pleasant 66 y.o. female with a PMH of HTN kindly referred by Dr. Stacy Langley for consultation regarding her LBP and bilateral leg pain. She states her pain began insidiously about 10 months ago. Her pain has steadily increased since then. She rates her back pain 7/10 and leg pain 9/10. She states left leg is worse than her right. The leg pain radiates down the lateral aspect of her thighs and crosses over to her medial knees. She admits to numbness and tingling in the same distribution on her legs. Pain is worse with sitting and walking while slightly alleviated with standing. She admits to occassional weakness of her legs and denies bowel or bladder dysfunction. She states she can sit for a maximum of 30 minutes and stand for a maximum one hour. The pain moderately disrupts her sleep. She has tried physical therapy and 3 epidural injections with minimal relief. She takes amitriptyline and NSAIDs for pain. Past medical history:  Her past medical history has been reviewed and is significant for HTN and depression    Past surgical history:  Her past surgical history has been reviewed and is non-contributory to her present illness. Her medications and allergies were reviewed. Social history:  Her social history has been reviewed and she is a former smoker. Review of symptoms:  Patient's review of symptoms was reviewed and is significant for difficulty sleeping and night sweats and negative for recent weight loss, fatigue, chills, visual disturbances, blood in stool or urine, recent infection, chest pain, or shortness of breath. Physical examination:  Ms. Arguello Alan [facility]'s most recent vitals:  Vitals  BP: 130/78  Pulse: 80  Height: 5' 2.99\" (160 cm)  Weight: 169 lb 15.6 oz (77.1 kg)  Body mass index is 30.12 kg/m². She is well-developed and well-nourished, is in obvious pain and alert and oriented to person, place, and time.  She demonstrates appropriate mood and affect. Her skin is warm and dry. Her gait is antalgic and she walk with her left hip and knee flexed. She stands with slight lumbar flexion. Her lumbar flexion, extension and lateral bending are moderately reduced with pain. She has mild tenderness over her lumbar spine without obvious muscle spasm. The skin over her lumbar spine is normal without a surgical scar. She has 5/5 strength of EHLs, FHLs, foot evertors, ankle dorsiflexors, plantarflexors, quadriceps, hamstrings, iliopsoas, abductors and adductors about the hips bilaterally. She has a negative straight leg raise, bilaterally. Achilles and quadriceps reflexes are 1+. Sensation is intact to light touch L3 to S1 bilaterally. She has no clonus. Hip range of motion painless. Imaging:  I reviewed MRI images of her lumbar spine from 10/17/2018. They note L4-5 disc protrusion with mild lumbar stenosis. Multilevel lumbar spondylosis. Assessment:  Lumbar spondylosis     Plan:  We discussed treatment options including observation, oral steroids, physical therapy, additional epidural injection and chiropractic care. Patient would like to try chiropractic care and home exercises.

## 2018-12-11 NOTE — Clinical Note
Please send a copy of my note to [facility]

## 2020-06-16 ENCOUNTER — HOSPITAL ENCOUNTER (OUTPATIENT)
Dept: GENERAL RADIOLOGY | Age: 60
Discharge: HOME | End: 2020-06-16
Payer: MEDICAID

## 2020-06-16 PROCEDURE — 73502 X-RAY EXAM HIP UNI 2-3 VIEWS: CPT

## 2020-06-16 PROCEDURE — 73562 X-RAY EXAM OF KNEE 3: CPT

## 2020-06-19 ENCOUNTER — TELEPHONE (OUTPATIENT)
Dept: ORTHOPEDIC SURGERY | Age: 60
End: 2020-06-19

## 2020-06-22 ENCOUNTER — OFFICE VISIT (OUTPATIENT)
Dept: ORTHOPEDIC SURGERY | Age: 60
End: 2020-06-22
Payer: MEDICAID

## 2020-06-22 VITALS — HEIGHT: 63 IN | WEIGHT: 169.97 LBS | BODY MASS INDEX: 30.12 KG/M2

## 2020-06-22 PROCEDURE — 99213 OFFICE O/P EST LOW 20 MIN: CPT | Performed by: ORTHOPAEDIC SURGERY

## 2020-06-22 PROCEDURE — 1036F TOBACCO NON-USER: CPT | Performed by: ORTHOPAEDIC SURGERY

## 2020-06-22 PROCEDURE — G8417 CALC BMI ABV UP PARAM F/U: HCPCS | Performed by: ORTHOPAEDIC SURGERY

## 2020-06-22 PROCEDURE — G8427 DOCREV CUR MEDS BY ELIG CLIN: HCPCS | Performed by: ORTHOPAEDIC SURGERY

## 2020-06-22 PROCEDURE — E0135 WALKER FOLDING ADJUST/FIXED: HCPCS | Performed by: ORTHOPAEDIC SURGERY

## 2020-06-22 PROCEDURE — 3017F COLORECTAL CA SCREEN DOC REV: CPT | Performed by: ORTHOPAEDIC SURGERY

## 2020-06-22 RX ORDER — TRAMADOL HYDROCHLORIDE 50 MG/1
50 TABLET ORAL EVERY 6 HOURS PRN
Qty: 28 TABLET | Refills: 0 | Status: SHIPPED | OUTPATIENT
Start: 2020-06-22 | End: 2020-07-06

## 2020-06-22 NOTE — PROGRESS NOTES
CHIEF COMPLAINT:    Chief Complaint   Patient presents with    Hip Pain     RIGHT HIP PAIN       HISTORY OF PRESENT ILLNESS:                The patient is a 64 y.o. female   Past Medical History:   Diagnosis Date    Asthma     Depression     Hypertension             The pain assessment was noted & is as follows:  Pain Assessment  Location of Pain: Pelvis  Location Modifiers: Right  Severity of Pain: 8  Quality of Pain: Dull, Aching  Duration of Pain: Persistent  Frequency of Pain: Constant  Aggravating Factors: Walking, Standing  Limiting Behavior: Some  Relieving Factors: Rest  Result of Injury: No  Work-Related Injury: No  Are there other pain locations you wish to document?: No]      Work Status/Functionality:     Past Medical History: Medical history form was reviewed today & can be found in the media tab  Past Medical History:   Diagnosis Date    Asthma     Depression     Hypertension       Past Surgical History:     Past Surgical History:   Procedure Laterality Date     SECTION      HYSTERECTOMY      [place] NJX DX/THER SBST INTRLMNR CRV/THRC W/IMG GDN Bilateral 2018    BILATERAL LUMBAR FIVE TRANSFORAMINAL EPIDURAL STEROID INJECTION SITE CONFIRMED BY FLUOROSCOPY performed by Adela Joseph MD at [facility] DX/THER SBST INTRLMNR CRV/THRC W/IMG GDN N/A 10/16/2018    RIGHT LUMBAR THREE AND LEFT LUMBAR FIVE TRANSFORAMINAL EPIDURAL STEROID INJECTION SITE CONFIRMED BY FLUOROSCOPY performed by Adela Joseph MD at [facility] DX/THER SBST INTRLMNR CRV/THRC W/IMG GDN Right 10/30/2018    RIGHT LUMBAR THREE AND LEFT LUMBAR FIVE TRANSFORAMINAL EPIDURAL STEROID INJECTION SITE CONFIRMED BY FLUOROSCOPY performed by Adela Joseph MD at [address] [place] [address] OR     Current Medications:     Current Outpatient Medications:     methocarbamol (ROBAXIN) 500 MG tablet, Take 500 mg by mouth 4 times daily, Disp: , Rfl:     albuterol sulfate  (90 Base) MCG/ACT inhaler, Inhale 2
patient's mood and affect are appropriate       RIGHT hip PHYSICAL EXAMINATION:  · Inspection: The patient is visibly uncomfortable. No obvious deformity of her aright hip or groin    · Palpation: mild tenderness to the groin. Mild tenderness in the lateral hip as well. · Range of Motion: Internal X rotation of the arnulfoght hip is very limited today secondary to pain. · Strength: Hip flexor hip abductor and adductor are limited secondary to pain. · Special Tests: Painful and diminished logroll testing of the RIGHT hip          · Skin:  There are no rashes, ulcerations or lesions. · There are no distal dysvascular changes     Gait & station: She ambulates today with a very slow antalgic gait pattern      Additional Examinations:        Left Lower Extremity: Examination of the left lower extremity does not show any tenderness, deformity or injury. Range of motion is unremarkable. There is no gross instability. There are no rashes, ulcerations or lesions. Strength and tone are normal.      Diagnostic Testing: The following x rays were read and interpreted by myself      I reviewed x-rays from 6/16/2020. Extensive lytic and sclerotic abnormal changes within the femoral heads concerning for advanced AVN, RIGHT greater than LEFT    Orders   No orders of the defined types were placed in this encounter. Assessment / Treatment Plan:     1. Avascular necrosis, RIGHT greater than LEFT. As outlined above, the AVN is quite extensive but not a lot of collapse. The patient is noticing progressively worsening and fairly disabling hip pain. She would like to move forward with aright total hip replacement. I do not think she is a candidate for any conservative measures given the advanced degenerative changes    She denies any excessive alcohol or drug use.     She will sign consent for aright total hip replacement using [employer] Oxinium arthroplasty    We discussed the risk, benefits and

## 2020-07-02 ENCOUNTER — TELEPHONE (OUTPATIENT)
Dept: ORTHOPEDIC SURGERY | Age: 60
End: 2020-07-02

## 2020-07-02 NOTE — TELEPHONE ENCOUNTER
Problem: Patient Care Overview  Goal: Plan of Care/Patient Progress Review  RN:  1.Pt will remain hemodynamically stable.    2.Tropinin/EKG WNL.   SLP: Dysphagia therapy cancelled.  Pt heading off unit at the time of session attempt.  ST to f/u later this date or tomorrow as schedule allows.         Tried to reach patient this afternoon to schedule her pre-op covid 19 test      No answer and Vm is full      ciara

## 2020-07-06 ENCOUNTER — TELEPHONE (OUTPATIENT)
Dept: ORTHOPEDIC SURGERY | Age: 60
End: 2020-07-06

## 2020-07-06 RX ORDER — TRAMADOL HYDROCHLORIDE 50 MG/1
50 TABLET ORAL EVERY 8 HOURS PRN
Qty: 21 TABLET | Refills: 0 | Status: SHIPPED | OUTPATIENT
Start: 2020-07-06 | End: 2020-07-13 | Stop reason: SDUPTHER

## 2020-07-06 NOTE — TELEPHONE ENCOUNTER
COVID: 7/23/2020- negative     Pt ok with same day discharge. Orthopedic Nurse Navigator Summary  -  Patient Name: Marny Sandifer  Anticipated Date of Surgery: 7/29/2020  Using [employer]? Yes, Are they Registered: Yes  Attended Pre-Op Education Class: No  If No, why not? PCP:  Phone #:  Date of PCP Visit for H&P: 07/23/2020  Any Noted Concerns from PCP prior to surgery: No  If Yes, what concerns?:  Is the Patient in a Pain Management Program?: No  Review of Past Medical History Reveals History of:  -  Critical Lab Values:  - Hemoglobin (g/dL): Date Value 13.3  - Hematocrit (%): Date Value  - HgbA1C : Date Value 6.0  - Albumin : Date Value 3.8  - BUN (mg/dL) : Date Value 17.0  - CREATININE (mg/dL) : Date Value 0.7  - BMI (kg/m2) : Date Value  -  Coronary Artery Disease/HTN/CHF History: Yes  Cardiologist: HTN managed by PCP and meds  Cardiac Clearance Necessary: No  Date of Cardiac Clearance Appt: On Plavix? No  If YES, when will they stop taking? Final Cardiac Recommendations: On any anticoagulation: No  -  Diabetes History: No  Most Recent HgbA1C: 6.0  PCP or Endocrine Recommendations:  Nutritionist/Dietician Consult Scheduled:  Final Plan For Diabetic Control:  Pulmonary: COPD/Emphysema/ Use of home oxygen: NONE- asthma  Alcohol use: Non-Drinker  -  DVT Risk Stratification: Low Risk  Vascular Consult Ordered:  Date of Vascular Appt:  Hematology/Oncology Consult Ordered:  Date of Hematology/Oncology Appt:  Final Recommendation For DVT Prophylaxis:  Smoking history: Former Smoker (> 6 weeks)  Use of Estrogen:  -  BMI Greater than 40 at time of scheduling?: No  Has Surgeon been notified of BMI concern? Yes  Weight Loss Clinic Consult Ordered No  Date of Wt Loss Clinic Appt:  BMI at time of surgery (if went through [facility]):  -  Additional Medical Concerns:   Additional Recommendations for above concerns:  Attended Pre-Hab Program: No  Anticipated Discharge Disposition: D/C home- ok with same day discharge  Who will be with patient at home following discharge? daughter  Equipment patient already has: walker- standard  Bedroom on first or second floor: first  Bathroom on first or second floor: first  Weight bearing status: full  Pre-op ambulatory status:  Number of entry steps: 6-7 steps up to apartment  Caregiver assistance: full time  Pratibha [facility]  07/16/2020

## 2020-07-13 ENCOUNTER — TELEPHONE (OUTPATIENT)
Dept: ORTHOPEDIC SURGERY | Age: 60
End: 2020-07-13

## 2020-07-13 RX ORDER — CYCLOBENZAPRINE HCL 5 MG
5-10 TABLET ORAL 3 TIMES DAILY PRN
Qty: 30 TABLET | Refills: 0 | Status: SHIPPED | OUTPATIENT
Start: 2020-07-13 | End: 2020-07-23

## 2020-07-13 RX ORDER — TRAMADOL HYDROCHLORIDE 50 MG/1
50 TABLET ORAL EVERY 8 HOURS PRN
Qty: 21 TABLET | Refills: 0 | Status: SHIPPED | OUTPATIENT
Start: 2020-07-13 | End: 2020-07-20

## 2020-07-13 NOTE — TELEPHONE ENCOUNTER
Returned call to Patients' Daughter Tamia Richards (yes on HIPPA_     No Answer/ No VM set up     [email or web address]

## 2020-07-14 ENCOUNTER — HOSPITAL ENCOUNTER (OUTPATIENT)
Age: 60
Discharge: HOME | End: 2020-07-14
Payer: MEDICAID

## 2020-07-14 LAB
ALBUMIN SERPL-MCNC: 3.8 G/DL (ref 3.4–5)
AMORPHOUS: ABNORMAL /HPF
ANION GAP SERPL CALCULATED.3IONS-SCNC: 16 MMOL/L (ref 3–16)
APTT: 28 SEC (ref 24.2–36.2)
BACTERIA: ABNORMAL /HPF
BASOPHILS ABSOLUTE: 0.1 K/UL (ref 0–0.2)
BASOPHILS RELATIVE PERCENT: 0.7 %
BILIRUBIN URINE: ABNORMAL
BLOOD, URINE: ABNORMAL
BUN BLDV-MCNC: 17 MG/DL (ref 7–20)
CALCIUM SERPL-MCNC: 10 MG/DL (ref 8.3–10.6)
CHLORIDE BLD-SCNC: 96 MMOL/L (ref 99–110)
CLARITY: ABNORMAL
CO2: 27 MMOL/L (ref 21–32)
COLOR: YELLOW
CREAT SERPL-MCNC: 0.7 MG/DL (ref 0.6–1.1)
EKG ATRIAL RATE: 64 BPM
EKG DIAGNOSIS: NORMAL
EKG P AXIS: 65 DEGREES
EKG P-R INTERVAL: 148 MS
EKG Q-T INTERVAL: 458 MS
EKG QRS DURATION: 92 MS
EKG QTC CALCULATION (BAZETT): 472 MS
EKG R AXIS: 59 DEGREES
EKG T AXIS: 66 DEGREES
EKG VENTRICULAR RATE: 64 BPM
EOSINOPHILS ABSOLUTE: 0.1 K/UL (ref 0–0.6)
EOSINOPHILS RELATIVE PERCENT: 1 %
EPITHELIAL CELLS, UA: ABNORMAL /HPF (ref 0–5)
GFR AFRICAN AMERICAN: >60
GFR NON-AFRICAN AMERICAN: >60
GLUCOSE BLD-MCNC: 117 MG/DL (ref 70–99)
GLUCOSE URINE: NEGATIVE MG/DL
HCT VFR BLD CALC: 39.9 % (ref 36–48)
HEMOGLOBIN: 13.3 G/DL (ref 12–16)
INR BLD: 1.09 (ref 0.86–1.14)
KETONES, URINE: ABNORMAL MG/DL
LEUKOCYTE ESTERASE, URINE: NEGATIVE
LYMPHOCYTES ABSOLUTE: 2 K/UL (ref 1–5.1)
LYMPHOCYTES RELATIVE PERCENT: 24.2 %
MCH RBC QN AUTO: 29.3 PG (ref 26–34)
MCHC RBC AUTO-ENTMCNC: 33.3 G/DL (ref 31–36)
MCV RBC AUTO: 88.1 FL (ref 80–100)
MICROSCOPIC EXAMINATION: YES
MONOCYTES ABSOLUTE: 0.5 K/UL (ref 0–1.3)
MONOCYTES RELATIVE PERCENT: 5.8 %
MUCUS: ABNORMAL /LPF
NEUTROPHILS ABSOLUTE: 5.6 K/UL (ref 1.7–7.7)
NEUTROPHILS RELATIVE PERCENT: 68.3 %
NITRITE, URINE: NEGATIVE
PDW BLD-RTO: 14.9 % (ref 12.4–15.4)
PH UA: 6 (ref 5–8)
PLATELET # BLD: 395 K/UL (ref 135–450)
PMV BLD AUTO: 8 FL (ref 5–10.5)
POTASSIUM SERPL-SCNC: 3.3 MMOL/L (ref 3.5–5.1)
PROTEIN UA: 30 MG/DL
PROTHROMBIN TIME: 12.7 SEC (ref 10–13.2)
RBC # BLD: 4.53 M/UL (ref 4–5.2)
RBC UA: ABNORMAL /HPF (ref 0–4)
SODIUM BLD-SCNC: 139 MMOL/L (ref 136–145)
SPECIFIC GRAVITY UA: 1.02 (ref 1–1.03)
TRANSFERRIN: 235 MG/DL (ref 200–360)
URINE TYPE: ABNORMAL
UROBILINOGEN, URINE: 0.2 E.U./DL
WBC # BLD: 8.3 K/UL (ref 4–11)
WBC UA: ABNORMAL /HPF (ref 0–5)

## 2020-07-14 PROCEDURE — 82040 ASSAY OF SERUM ALBUMIN: CPT

## 2020-07-14 PROCEDURE — 85025 COMPLETE CBC W/AUTO DIFF WBC: CPT

## 2020-07-14 PROCEDURE — 85730 THROMBOPLASTIN TIME PARTIAL: CPT

## 2020-07-14 PROCEDURE — 80048 BASIC METABOLIC PNL TOTAL CA: CPT

## 2020-07-14 PROCEDURE — 93005 ELECTROCARDIOGRAM TRACING: CPT | Performed by: ORTHOPAEDIC SURGERY

## 2020-07-14 PROCEDURE — 83036 HEMOGLOBIN GLYCOSYLATED A1C: CPT

## 2020-07-14 PROCEDURE — 87086 URINE CULTURE/COLONY COUNT: CPT

## 2020-07-14 PROCEDURE — 93010 ELECTROCARDIOGRAM REPORT: CPT | Performed by: INTERNAL MEDICINE

## 2020-07-14 PROCEDURE — 85610 PROTHROMBIN TIME: CPT

## 2020-07-14 PROCEDURE — 36415 COLL VENOUS BLD VENIPUNCTURE: CPT

## 2020-07-14 PROCEDURE — 84466 ASSAY OF TRANSFERRIN: CPT

## 2020-07-14 PROCEDURE — 81001 URINALYSIS AUTO W/SCOPE: CPT

## 2020-07-15 LAB
ESTIMATED AVERAGE GLUCOSE: 125.5 MG/DL
HBA1C MFR BLD: 6 %
URINE CULTURE, ROUTINE: NORMAL

## 2020-07-21 RX ORDER — TRAMADOL HYDROCHLORIDE 50 MG/1
50 TABLET ORAL EVERY 6 HOURS PRN
Qty: 28 TABLET | Refills: 0 | Status: SHIPPED | OUTPATIENT
Start: 2020-07-21 | End: 2020-07-28

## 2020-07-22 ENCOUNTER — TELEPHONE (OUTPATIENT)
Dept: ORTHOPEDIC SURGERY | Age: 60
End: 2020-07-22

## 2020-07-22 NOTE — TELEPHONE ENCOUNTER
Carolina Antonina [id number]    Date: 07/29/20  Type of SX:  INPATIENT CHANGED TO OUTPATIENT/23 HOUR HOLD  [place] [facility] [place] 34559   DX Code: M87.051  SX area: R HIP  Insurance:  [address] [place] [address]

## 2020-07-22 NOTE — PROGRESS NOTES
PRE OP INSTRUCTION SHEET   1. Do not eat or drink anything after 12 midnight  prior to surgery. This includes no water, chewing gum or mints. 2. Take the following pills will a small sip of water (see MAR)                                        3. Aspirin, Ibuprofen, Advil, Naproxen, Vitamin E, fish oil and other Anti-inflammatory products should be stopped for 5 days before surgery or as directed by your physician. 4. Check with your Doctor regarding stopping Plavix, Coumadin, Lovenox, Fragmin or other blood thinners   5. Do not smoke, and do not drink any alcoholic beverages 24 hours prior to surgery. This includes NA Beer. 6. You may brush your teeth and gargle the morning of surgery. DO NOT SWALLOW WATER   7. You MUST make arrangements for a responsible adult to take you home after your surgery. You will not be allowed to leave alone or drive yourself home. It is strongly suggested someone stay with you the first 24 hrs. Your surgery will be cancelled if you do not have a ride home. 8. A parent/legal guardian must accompany a child scheduled for surgery and plan to stay at the hospital until the child is discharged. Please do not bring other children with you. 9. Please wear simple, loose fitting clothing to the hospital.  Stephens Ek not bring valuables (money, credit cards, checkbooks, etc.) Do not wear any makeup (including no eye makeup) or nail polish on your fingers or toes. 10. DO NOT wear any jewelry or piercings on day of surgery. All body piercing jewelry must be removed. 11. If you have dentures,glasses, or contacts they will be removed before going to the OR; we will provide you a container. 12. Please see your family doctor/and cardiologist for a history & physical and/or concerning medications. Bring any test results/reports from your physician's office. Have history and labs faxed to [phone].  Remember to bring Blood Bank bracelet on the day of surgery. 14. If you have a Living Will and Durable Power of  for Healthcare, please bring in a copy. 13. Notify your Surgeon if you develop any illness between now and surgery  time, cough, cold, fever, sore throat, nausea, vomiting, etc.  Please notify your surgeon if you experience dizziness, shortness of breath or blurred vision between now & the time of your surgery   16. DO NOT shave your operative site 96 hours prior to surgery. For face & neck surgery, men may use an electric razor 48 hours prior to surgery. 17. Shower with _x__Antibacterial soap (x_chlorhexidine for total joint  Pt's) shower two times before surgery.(the morning of and the night before. 18. To provide excellent care visitors will be limited to one in the room at any given time.   Please call pre admission testing if you any further questions [phone] or [phone]

## 2020-07-22 NOTE — PROGRESS NOTES
Pat done. Instructed to call staff if having signs of fever, cough or sob. Surgery Consult    Subjective:   Ms. Corbin Alejandre an 48 y.o. female who was referred for evaluation and treatment of a mass located in the sigmoid colon. Mass was identified by colonoscopy. Current symptoms include Lower Rectal Bleeding - 578.9. The mass was biopsied. Pathology is positive for adenocarcinoma. The lesion was tattooed. Colonoscopy Colonoscopy was complete to cecum. 10/24/19  POD#1- s/p AF, VSS, on 2L NC.  1.1L UOP/24h via kline. Mild nausea, -vomiting. C/o pain. She feels rumbling. K+ 3.8, Cr 0.81. WBC 10.8, H/H 10/33.   10/25/19  POD#2- s/p AF, VSS, on 2L NC.  2.6L UOP/24h via kline. Tolerating Clear liquids. No nausea or vomiting.  +flatus. Pain controlled. Kline patent. 10/26/19  POD#3- s/p  Voiding without difficulty. Tolerating Full liquids. Not taking in a large amount because the food does not taste good. No nausea or vomiting.  +flatus (large amount). Pain controlled. AF, VSS. 10/27/19  POD#4- Voiding without difficulty. Tolerating Soft diet. No nausea or vomiting.  +flatus (large amount). Pain controlled. Has been ambulating in the halls. AF, VSS.      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     Patient Active Problem List    Diagnosis Date Noted    Benign neoplasm of left ovary 10/24/2019    [address] cancer [facility] [address] 10/23/2019    Essential hypertension with goal blood pressure less than 140/90 06/13/2019    Generalized anxiety disorder 06/13/2019    Reflux esophagitis 06/13/2019    Seasonal allergic rhinitis due to pollen 06/13/2019    History of palpitations 06/29/2016       Allergies   Allergen Reactions    Sulfa (Sulfonamide Antibiotics) Hives     Past Medical History:   Diagnosis Date    Allergic rhinitis     Anxiety     dx at age 29 on Cymbalta for  5 years    Cancer ([facility] [address] 09/2019    Polyp in colon    GERD (gastroesophageal reflux disease)     managed with daily omeprazole    Hypertension     dx at age 29    Morbid obesity ([facility] [address]     Panic attack     Psychiatric disorder     panic attacks, anxiety    Reflux     dx at 29     Past Surgical History:   Procedure Laterality Date    COLONOSCOPY N/A 9/17/2019    COLONOSCOPY/ 33 performed [facility]COPY    HX APPENDECTOMY      HX CHOLECYSTECTOMY      HX HYSTERECTOMY      due to fibroid tumor    HX ORTHOPAEDIC  2009    L4 disectomy     Family History   Problem Relation Age of Onset    High Cholesterol Mother     Hypertension Mother     Diabetes Mother     High Cholesterol Father     Hypertension Father     Cancer Father         prostate    Cancer Paternal Grandmother         colon     Social History     Tobacco Use    Smoking status: Never Smoker    Smokeless tobacco: Never Used   Substance Use Topics    Alcohol use: No     Review of Systems  ROS documented by nursing, reviewed with patient. Objective   Objective:     Visit Vitals  /87 (BP 1 Location: Right arm, BP Patient Position: At rest)   Pulse 69   Temp 98.1 °F (36.7 °C)   Resp 18   Wt 189 lb (85.7 kg)   SpO2 95%   BMI 31.45 kg/m²     Gen: Well developed, well nourished 48 y.o. female in no acute distress  Head: normocephalic, atraumatic  Mouth: Clear, oral mucosa pink and moist  Neck: supple, no masses, no adenopathy, trachea midline  Resp: clear to auscultation bilaterally, no wheeze, rhonchi or rales, excursions normal and symmetrical  Cardio: Regular rate and rhythm, no murmurs, clicks, gallops or rubs, no edema or varicosities  Abdomen: staples c/d/i; + BS. Appropriate tenderness. Non distended.   Extremities: warm, well-perfused, no tenderness or swelling, normal gait/station  Neuro: sensation and strength grossly intact and symmetrical  Psych: alert and oriented to person, place and time    CT Results (most recent):  Results from Hospital Encounter encounter on 10/07/19   CT ABD PELV W CONT    Narrative CT ABDOMEN AND PELVIS WITH CONTRAST    HISTORY: New diagnosis of colon cancer - sigmoid, 48 years Female history of  appendectomy. COMPARISON: None available    TECHNIQUE: Oral contrast was administered. 100 cc of nonionic intravenous  contrast was injected, and axial helical CT images were obtained from above the  diaphragm through the pelvis. Coronal reformatted images were obtained at the  scanner console and made available for review. All CT scans at this location  are performed using dose modulation techniques as appropriate to a performed  exam including the following: automated exposure control; adjustment of the mA  and/or kV according to patient's size (this includes techniques or standardized  protocols for targeted exams where dose is matched to indication/reason for  exam; i.e. extremities or head); use of iterative reconstruction technique. FINDINGS:    ABDOMEN:  Minimal dependent subsegmental atelectasis bilateral lung bases. Evidence of  cholecystectomy. A small indeterminate centrally hypoenhancing and peripherally  hypoenhancing lesion is seen in segment 5 of the liver subcapsular location,  measuring approximately 1.8 x 2.2 cm, indeterminate on this single phase  contrast CT. Normal-appearing spleen, pancreas, bilateral adrenal glands and  kidneys. Mild calcific atherosclerosis of a normal caliber abdominal aorta. No  evidence of significant lymphadenopathy. Normal-appearing small bowel. No  evidence of intraperitoneal free air or free fluid. PELVIS:  Normal-appearing urinary bladder. Patient is status post hysterectomy.   There  is a mildly complex left adnexal cystic lesion with internal septations with  some suggestion of septal enhancement measuring approximately 10.3 x 9.8 cm,  this appearance is highly suspicious for a primary ovarian cystic neoplasm. Normal-appearing right adnexa. The patient's known primary sigmoid carcinoma is  not well visualized here. No evidence of pelvic free fluid. No evidence of  significant inguinal or pelvic sidewall lymphadenopathy. Small sclerotic lesion  in the left iliac bone without adjacent aggressive cortical reaction. Visualized osseous structures otherwise unremarkable. Impression IMPRESSION:     1. Findings highly suspicious for a primary ovarian cystic neoplasm arising  from the left ovary. Urgent gynecology consultation is recommended. 2.  Indeterminate small enhancing lesion in segment 5 of the liver, nonspecific  on this single phase contrast CT, however metastasis is not excluded. Recommend  elective MRI of the abdomen with and without contrast liver mass protocol for  further evaluation. 3.  The known primary sigmoid carcinoma is not well visualized here. 689                    Lab Results   Component Value Date/Time    WBC 10.8 10/24/2019 04:17 AM    HGB (POC) 13.6 03/30/2017 09:56 AM    HGB 10.8 (L) 10/24/2019 04:17 AM    HCT (POC) 40.0 03/30/2017 09:56 AM    HCT 33.4 (L) 10/24/2019 04:17 AM    PLATELET 634 52/17/2529 04:17 AM    MCV 88.1 10/24/2019 04:17 AM     Lab Results   Component Value Date/Time    Sodium 140 10/24/2019 04:17 AM    Potassium 3.8 10/24/2019 04:17 AM    Chloride 105 10/24/2019 04:17 AM    CO2 28 10/24/2019 04:17 AM    Anion gap 7 10/24/2019 04:17 AM    Glucose 110 (H) 10/24/2019 04:17 AM    BUN 9 10/24/2019 04:17 AM    Creatinine 0.81 10/24/2019 04:17 AM    BUN/Creatinine ratio 16 06/13/2019 03:20 PM    GFR est AA >60 10/24/2019 04:17 AM    GFR est non-AA >60 10/24/2019 04:17 AM    Calcium 8.2 (L) 10/24/2019 04:17 AM        DIAGNOSIS   COLON POLYP AT 15 CM: FRAGMENTS OF IN SITU AND AT LEAST SUPERFICIALLY INFILTRATING ADENOCARCINOMA INVOLVING MIXED TUBULOVILLOUS ADENOMA.       Assessment   Assessment/Plan:   Krishan Mari Alison Greenberg is an 48 y.o. female with mass located in the sigmoid colon. Pt was also found to have a suspicious ovarian mass on CT scan. POD #4 s/p open resection of ovarian tumor and sigmoid colectomy     Soft diet  DC IVFs  Pain control  PTA meds  Recinos out 10/25/19  Path resulted - No malignant cells identified  Prophylaxis with Protonix, SCDs, IS.    Home later today    Rosy Dorsey NP

## 2020-07-22 NOTE — PROGRESS NOTES
Preoperative Screening for Elective Surgery/Invasive Procedures While COVID-19 present in the community     Have you tested positive or have been told to self-isolate for COVID-19 like symptoms within the past 28 days? no   Do you currently have any of the following symptoms? o Fever >100.0 F or 99.9 F in immunocompromised patients? no  o New onset cough, shortness of breath or difficulty breathing?no  o New onset sore throat, myalgia (muscle aches and pains), headache, loss of taste/smell or diarrhea? no   Have you had a potential exposure to COVID-19 within the past 14 days by:  o Close contact with a confirmed case?no  o Close contact with a healthcare worker,  or essential infrastructure worker (grocery store, [employer], gas station, public utilities or transportation)? yes  o Do you reside in a congregate setting such as; skilled nursing facility, adult home, correctional facility, homeless shelter or other institutional setting?no  o Have you had recent travel to a known COVID-19 hotspot? no    Indicate if the patient has a positive screen by answering yes to one or more of the above questions. Patients who test positive or screen positive prior to surgery or on the day of surgery should be evaluated in conjunction with the surgeon/proceduralist/anesthesiologist to determine the urgency of the procedure.

## 2020-07-23 ENCOUNTER — HOSPITAL ENCOUNTER (OUTPATIENT)
Age: 60
Discharge: HOME | End: 2020-07-23
Payer: MEDICAID

## 2020-07-23 LAB — SARS-COV-2, NAAT: NOT DETECTED

## 2020-07-23 PROCEDURE — U0002 COVID-19 LAB TEST NON-CDC: HCPCS

## 2020-07-27 ENCOUNTER — ANESTHESIA EVENT (OUTPATIENT)
Dept: OPERATING ROOM | Age: 60
End: 2020-07-27
Payer: MEDICAID

## 2020-07-29 ENCOUNTER — APPOINTMENT (OUTPATIENT)
Dept: GENERAL RADIOLOGY | Age: 60
End: 2020-07-29
Attending: ORTHOPAEDIC SURGERY
Payer: MEDICAID

## 2020-07-29 ENCOUNTER — ANESTHESIA (OUTPATIENT)
Dept: OPERATING ROOM | Age: 60
End: 2020-07-29
Payer: MEDICAID

## 2020-07-29 ENCOUNTER — HOSPITAL ENCOUNTER (OUTPATIENT)
Age: 60
Setting detail: OBSERVATION
Discharge: HOME | End: 2020-07-30
Attending: ORTHOPAEDIC SURGERY | Admitting: ORTHOPAEDIC SURGERY
Payer: MEDICAID

## 2020-07-29 VITALS
RESPIRATION RATE: 8 BRPM | SYSTOLIC BLOOD PRESSURE: 107 MMHG | DIASTOLIC BLOOD PRESSURE: 53 MMHG | OXYGEN SATURATION: 100 %

## 2020-07-29 PROBLEM — I10 ESSENTIAL HYPERTENSION: Status: ACTIVE | Noted: 2020-07-29

## 2020-07-29 PROBLEM — M87.051 AVASCULAR NECROSIS OF BONE OF RIGHT HIP (HCC): Status: ACTIVE | Noted: 2020-07-29

## 2020-07-29 PROBLEM — J45.20 MILD INTERMITTENT ASTHMA WITHOUT COMPLICATION: Status: ACTIVE | Noted: 2020-07-29

## 2020-07-29 PROBLEM — Z96.641 H/O TOTAL HIP ARTHROPLASTY, RIGHT: Status: ACTIVE | Noted: 2020-07-29

## 2020-07-29 LAB
ABO/RH: NORMAL
ANTIBODY SCREEN: NORMAL

## 2020-07-29 PROCEDURE — 7100000001 HC PACU RECOVERY - ADDTL 15 MIN: Performed by: ORTHOPAEDIC SURGERY

## 2020-07-29 PROCEDURE — G0378 HOSPITAL OBSERVATION PER HR: HCPCS

## 2020-07-29 PROCEDURE — 2580000003 HC RX 258: Performed by: PHYSICIAN ASSISTANT

## 2020-07-29 PROCEDURE — 3600000015 HC SURGERY LEVEL 5 ADDTL 15MIN: Performed by: ORTHOPAEDIC SURGERY

## 2020-07-29 PROCEDURE — 2709999900 HC NON-CHARGEABLE SUPPLY: Performed by: ORTHOPAEDIC SURGERY

## 2020-07-29 PROCEDURE — 6370000000 HC RX 637 (ALT 250 FOR IP): Performed by: ANESTHESIOLOGY

## 2020-07-29 PROCEDURE — 6360000002 HC RX W HCPCS: Performed by: ANESTHESIOLOGY

## 2020-07-29 PROCEDURE — 6360000002 HC RX W HCPCS: Performed by: PHYSICIAN ASSISTANT

## 2020-07-29 PROCEDURE — 3600000005 HC SURGERY LEVEL 5 BASE: Performed by: ORTHOPAEDIC SURGERY

## 2020-07-29 PROCEDURE — 97166 OT EVAL MOD COMPLEX 45 MIN: CPT

## 2020-07-29 PROCEDURE — 97110 THERAPEUTIC EXERCISES: CPT

## 2020-07-29 PROCEDURE — 2720000010 HC SURG SUPPLY STERILE: Performed by: ORTHOPAEDIC SURGERY

## 2020-07-29 PROCEDURE — 2500000003 HC RX 250 WO HCPCS: Performed by: ORTHOPAEDIC SURGERY

## 2020-07-29 PROCEDURE — 97116 GAIT TRAINING THERAPY: CPT

## 2020-07-29 PROCEDURE — 6370000000 HC RX 637 (ALT 250 FOR IP): Performed by: PHYSICIAN ASSISTANT

## 2020-07-29 PROCEDURE — 99223 1ST HOSP IP/OBS HIGH 75: CPT | Performed by: INTERNAL MEDICINE

## 2020-07-29 PROCEDURE — 3700000001 HC ADD 15 MINUTES (ANESTHESIA): Performed by: ORTHOPAEDIC SURGERY

## 2020-07-29 PROCEDURE — 3700000000 HC ANESTHESIA ATTENDED CARE: Performed by: ORTHOPAEDIC SURGERY

## 2020-07-29 PROCEDURE — 36415 COLL VENOUS BLD VENIPUNCTURE: CPT

## 2020-07-29 PROCEDURE — 73501 X-RAY EXAM HIP UNI 1 VIEW: CPT

## 2020-07-29 PROCEDURE — 64450 NJX AA&/STRD OTHER PN/BRANCH: CPT | Performed by: ANESTHESIOLOGY

## 2020-07-29 PROCEDURE — 7100000000 HC PACU RECOVERY - FIRST 15 MIN: Performed by: ORTHOPAEDIC SURGERY

## 2020-07-29 PROCEDURE — 86900 BLOOD TYPING SEROLOGIC ABO: CPT

## 2020-07-29 PROCEDURE — 2580000003 HC RX 258: Performed by: ANESTHESIOLOGY

## 2020-07-29 PROCEDURE — 2500000003 HC RX 250 WO HCPCS: Performed by: ANESTHESIOLOGY

## 2020-07-29 PROCEDURE — 86850 RBC ANTIBODY SCREEN: CPT

## 2020-07-29 PROCEDURE — 86901 BLOOD TYPING SEROLOGIC RH(D): CPT

## 2020-07-29 PROCEDURE — 2580000003 HC RX 258: Performed by: ORTHOPAEDIC SURGERY

## 2020-07-29 PROCEDURE — 6360000002 HC RX W HCPCS: Performed by: NURSE ANESTHETIST, CERTIFIED REGISTERED

## 2020-07-29 PROCEDURE — 6360000002 HC RX W HCPCS: Performed by: ORTHOPAEDIC SURGERY

## 2020-07-29 PROCEDURE — 97161 PT EVAL LOW COMPLEX 20 MIN: CPT

## 2020-07-29 PROCEDURE — 2500000003 HC RX 250 WO HCPCS: Performed by: NURSE ANESTHETIST, CERTIFIED REGISTERED

## 2020-07-29 PROCEDURE — C1776 JOINT DEVICE (IMPLANTABLE): HCPCS | Performed by: ORTHOPAEDIC SURGERY

## 2020-07-29 PROCEDURE — 2580000003 HC RX 258: Performed by: NURSE ANESTHETIST, CERTIFIED REGISTERED

## 2020-07-29 DEVICE — R3 3 HOLE ACETABULAR SHELL 52MM
Type: IMPLANTABLE DEVICE | Site: HIP | Status: FUNCTIONAL
Brand: R3 ACETABULAR

## 2020-07-29 DEVICE — R3 20 DEGREE XLPE ACETABULAR LINER                                    36MM INNER DIAMETER X OUTER DIAMETER 52MM
Type: IMPLANTABLE DEVICE | Site: HIP | Status: FUNCTIONAL
Brand: R3

## 2020-07-29 DEVICE — SYNERGY POROUS FEMORAL COMPONENT SZ 14
Type: IMPLANTABLE DEVICE | Site: HIP | Status: FUNCTIONAL
Brand: SYNERGY

## 2020-07-29 DEVICE — REFLECTION THREADED HOLE COVER
Type: IMPLANTABLE DEVICE | Site: HIP | Status: FUNCTIONAL
Brand: REFLECTION

## 2020-07-29 DEVICE — OXINIUM FEMORAL HEAD 12/14 TAPER                                    36 MM +0
Type: IMPLANTABLE DEVICE | Site: HIP | Status: FUNCTIONAL
Brand: OXINIUM

## 2020-07-29 DEVICE — REFLECTION SPHERICAL HEAD SCREW 25MM
Type: IMPLANTABLE DEVICE | Site: HIP | Status: FUNCTIONAL
Brand: REFLECTION

## 2020-07-29 DEVICE — R3 SCREW HOLE COVER
Type: IMPLANTABLE DEVICE | Site: HIP | Status: FUNCTIONAL
Brand: R3

## 2020-07-29 RX ORDER — FENTANYL CITRATE 50 UG/ML
INJECTION, SOLUTION INTRAMUSCULAR; INTRAVENOUS PRN
Status: DISCONTINUED | OUTPATIENT
Start: 2020-07-29 | End: 2020-07-29 | Stop reason: SDUPTHER

## 2020-07-29 RX ORDER — CELECOXIB 100 MG/1
100 CAPSULE ORAL 2 TIMES DAILY
Status: DISCONTINUED | OUTPATIENT
Start: 2020-07-29 | End: 2020-07-30 | Stop reason: HOSPADM

## 2020-07-29 RX ORDER — DIPHENHYDRAMINE HYDROCHLORIDE 50 MG/ML
12.5 INJECTION INTRAMUSCULAR; INTRAVENOUS
Status: DISCONTINUED | OUTPATIENT
Start: 2020-07-29 | End: 2020-07-29 | Stop reason: HOSPADM

## 2020-07-29 RX ORDER — SENNA AND DOCUSATE SODIUM 50; 8.6 MG/1; MG/1
1 TABLET, FILM COATED ORAL 2 TIMES DAILY
Status: DISCONTINUED | OUTPATIENT
Start: 2020-07-29 | End: 2020-07-30 | Stop reason: HOSPADM

## 2020-07-29 RX ORDER — LABETALOL HYDROCHLORIDE 5 MG/ML
5 INJECTION, SOLUTION INTRAVENOUS EVERY 10 MIN PRN
Status: DISCONTINUED | OUTPATIENT
Start: 2020-07-29 | End: 2020-07-29 | Stop reason: HOSPADM

## 2020-07-29 RX ORDER — ONDANSETRON 2 MG/ML
INJECTION INTRAMUSCULAR; INTRAVENOUS PRN
Status: DISCONTINUED | OUTPATIENT
Start: 2020-07-29 | End: 2020-07-29 | Stop reason: SDUPTHER

## 2020-07-29 RX ORDER — OXYCODONE HYDROCHLORIDE 5 MG/1
5 TABLET ORAL EVERY 4 HOURS PRN
Status: DISCONTINUED | OUTPATIENT
Start: 2020-07-29 | End: 2020-07-30 | Stop reason: HOSPADM

## 2020-07-29 RX ORDER — LIDOCAINE HYDROCHLORIDE 20 MG/ML
INJECTION, SOLUTION INFILTRATION; PERINEURAL PRN
Status: DISCONTINUED | OUTPATIENT
Start: 2020-07-29 | End: 2020-07-29 | Stop reason: SDUPTHER

## 2020-07-29 RX ORDER — CELECOXIB 200 MG/1
200 CAPSULE ORAL ONCE
Status: COMPLETED | OUTPATIENT
Start: 2020-07-29 | End: 2020-07-29

## 2020-07-29 RX ORDER — PROMETHAZINE HYDROCHLORIDE 25 MG/ML
6.25 INJECTION, SOLUTION INTRAMUSCULAR; INTRAVENOUS
Status: DISCONTINUED | OUTPATIENT
Start: 2020-07-29 | End: 2020-07-29 | Stop reason: HOSPADM

## 2020-07-29 RX ORDER — FLUOXETINE HYDROCHLORIDE 20 MG/1
20 CAPSULE ORAL DAILY
Status: DISCONTINUED | OUTPATIENT
Start: 2020-07-30 | End: 2020-07-30 | Stop reason: HOSPADM

## 2020-07-29 RX ORDER — GABAPENTIN 300 MG/1
300 CAPSULE ORAL ONCE
Status: COMPLETED | OUTPATIENT
Start: 2020-07-29 | End: 2020-07-29

## 2020-07-29 RX ORDER — OXYCODONE HYDROCHLORIDE AND ACETAMINOPHEN 5; 325 MG/1; MG/1
2 TABLET ORAL PRN
Status: DISCONTINUED | OUTPATIENT
Start: 2020-07-29 | End: 2020-07-29 | Stop reason: HOSPADM

## 2020-07-29 RX ORDER — MIDAZOLAM HYDROCHLORIDE 1 MG/ML
INJECTION INTRAMUSCULAR; INTRAVENOUS
Status: COMPLETED
Start: 2020-07-29 | End: 2020-07-29

## 2020-07-29 RX ORDER — SODIUM CHLORIDE 9 MG/ML
INJECTION, SOLUTION INTRAVENOUS CONTINUOUS PRN
Status: DISCONTINUED | OUTPATIENT
Start: 2020-07-29 | End: 2020-07-29 | Stop reason: SDUPTHER

## 2020-07-29 RX ORDER — SODIUM CHLORIDE, SODIUM LACTATE, POTASSIUM CHLORIDE, CALCIUM CHLORIDE 600; 310; 30; 20 MG/100ML; MG/100ML; MG/100ML; MG/100ML
INJECTION, SOLUTION INTRAVENOUS CONTINUOUS
Status: DISCONTINUED | OUTPATIENT
Start: 2020-07-29 | End: 2020-07-29

## 2020-07-29 RX ORDER — SODIUM CHLORIDE 0.9 % (FLUSH) 0.9 %
10 SYRINGE (ML) INJECTION EVERY 12 HOURS SCHEDULED
Status: DISCONTINUED | OUTPATIENT
Start: 2020-07-29 | End: 2020-07-30 | Stop reason: HOSPADM

## 2020-07-29 RX ORDER — CETIRIZINE HYDROCHLORIDE 10 MG/1
10 TABLET ORAL DAILY
Status: DISCONTINUED | OUTPATIENT
Start: 2020-07-29 | End: 2020-07-30 | Stop reason: HOSPADM

## 2020-07-29 RX ORDER — SODIUM CHLORIDE 0.9 % (FLUSH) 0.9 %
10 SYRINGE (ML) INJECTION PRN
Status: DISCONTINUED | OUTPATIENT
Start: 2020-07-29 | End: 2020-07-30 | Stop reason: HOSPADM

## 2020-07-29 RX ORDER — BUPIVACAINE HYDROCHLORIDE 2.5 MG/ML
INJECTION, SOLUTION EPIDURAL; INFILTRATION; INTRACAUDAL PRN
Status: DISCONTINUED | OUTPATIENT
Start: 2020-07-29 | End: 2020-07-29 | Stop reason: SDUPTHER

## 2020-07-29 RX ORDER — MORPHINE SULFATE 4 MG/ML
4 INJECTION, SOLUTION INTRAMUSCULAR; INTRAVENOUS
Status: DISCONTINUED | OUTPATIENT
Start: 2020-07-29 | End: 2020-07-30 | Stop reason: HOSPADM

## 2020-07-29 RX ORDER — SODIUM CHLORIDE 0.9 % (FLUSH) 0.9 %
10 SYRINGE (ML) INJECTION PRN
Status: DISCONTINUED | OUTPATIENT
Start: 2020-07-29 | End: 2020-07-29 | Stop reason: HOSPADM

## 2020-07-29 RX ORDER — HYDRALAZINE HYDROCHLORIDE 20 MG/ML
5 INJECTION INTRAMUSCULAR; INTRAVENOUS EVERY 10 MIN PRN
Status: DISCONTINUED | OUTPATIENT
Start: 2020-07-29 | End: 2020-07-29 | Stop reason: HOSPADM

## 2020-07-29 RX ORDER — ACETAMINOPHEN 325 MG/1
650 TABLET ORAL EVERY 6 HOURS
Status: DISCONTINUED | OUTPATIENT
Start: 2020-07-29 | End: 2020-07-30 | Stop reason: HOSPADM

## 2020-07-29 RX ORDER — OXYCODONE HYDROCHLORIDE 5 MG/1
10 TABLET ORAL EVERY 4 HOURS PRN
Status: DISCONTINUED | OUTPATIENT
Start: 2020-07-29 | End: 2020-07-30 | Stop reason: HOSPADM

## 2020-07-29 RX ORDER — MAGNESIUM HYDROXIDE 1200 MG/15ML
LIQUID ORAL CONTINUOUS PRN
Status: COMPLETED | OUTPATIENT
Start: 2020-07-29 | End: 2020-07-29

## 2020-07-29 RX ORDER — MEPERIDINE HYDROCHLORIDE 25 MG/ML
12.5 INJECTION INTRAMUSCULAR; INTRAVENOUS; SUBCUTANEOUS EVERY 5 MIN PRN
Status: DISCONTINUED | OUTPATIENT
Start: 2020-07-29 | End: 2020-07-29 | Stop reason: HOSPADM

## 2020-07-29 RX ORDER — PROPOFOL 10 MG/ML
INJECTION, EMULSION INTRAVENOUS PRN
Status: DISCONTINUED | OUTPATIENT
Start: 2020-07-29 | End: 2020-07-29 | Stop reason: SDUPTHER

## 2020-07-29 RX ORDER — HYDROCHLOROTHIAZIDE 25 MG/1
12.5 TABLET ORAL DAILY
Status: DISCONTINUED | OUTPATIENT
Start: 2020-07-30 | End: 2020-07-30

## 2020-07-29 RX ORDER — ACETAMINOPHEN 500 MG
1000 TABLET ORAL ONCE
Status: COMPLETED | OUTPATIENT
Start: 2020-07-29 | End: 2020-07-29

## 2020-07-29 RX ORDER — SODIUM CHLORIDE, SODIUM LACTATE, POTASSIUM CHLORIDE, CALCIUM CHLORIDE 600; 310; 30; 20 MG/100ML; MG/100ML; MG/100ML; MG/100ML
INJECTION, SOLUTION INTRAVENOUS CONTINUOUS PRN
Status: DISCONTINUED | OUTPATIENT
Start: 2020-07-29 | End: 2020-07-29 | Stop reason: SDUPTHER

## 2020-07-29 RX ORDER — FLUOXETINE HYDROCHLORIDE 20 MG/1
20 CAPSULE ORAL DAILY
COMMUNITY

## 2020-07-29 RX ORDER — MORPHINE SULFATE 10 MG/ML
2 INJECTION, SOLUTION INTRAMUSCULAR; INTRAVENOUS EVERY 5 MIN PRN
Status: DISCONTINUED | OUTPATIENT
Start: 2020-07-29 | End: 2020-07-29 | Stop reason: HOSPADM

## 2020-07-29 RX ORDER — SODIUM CHLORIDE 9 MG/ML
INJECTION, SOLUTION INTRAVENOUS CONTINUOUS
Status: DISCONTINUED | OUTPATIENT
Start: 2020-07-29 | End: 2020-07-29

## 2020-07-29 RX ORDER — SODIUM CHLORIDE 0.9 % (FLUSH) 0.9 %
10 SYRINGE (ML) INJECTION EVERY 12 HOURS SCHEDULED
Status: DISCONTINUED | OUTPATIENT
Start: 2020-07-29 | End: 2020-07-29 | Stop reason: HOSPADM

## 2020-07-29 RX ORDER — GABAPENTIN 300 MG/1
300 CAPSULE ORAL 3 TIMES DAILY
Status: DISCONTINUED | OUTPATIENT
Start: 2020-07-29 | End: 2020-07-30 | Stop reason: HOSPADM

## 2020-07-29 RX ORDER — LIDOCAINE HYDROCHLORIDE 10 MG/ML
0.3 INJECTION, SOLUTION EPIDURAL; INFILTRATION; INTRACAUDAL; PERINEURAL
Status: DISCONTINUED | OUTPATIENT
Start: 2020-07-29 | End: 2020-07-29 | Stop reason: HOSPADM

## 2020-07-29 RX ORDER — DEXAMETHASONE SODIUM PHOSPHATE 4 MG/ML
INJECTION, SOLUTION INTRA-ARTICULAR; INTRALESIONAL; INTRAMUSCULAR; INTRAVENOUS; SOFT TISSUE PRN
Status: DISCONTINUED | OUTPATIENT
Start: 2020-07-29 | End: 2020-07-29 | Stop reason: SDUPTHER

## 2020-07-29 RX ORDER — MORPHINE SULFATE 10 MG/ML
1 INJECTION, SOLUTION INTRAMUSCULAR; INTRAVENOUS EVERY 5 MIN PRN
Status: DISCONTINUED | OUTPATIENT
Start: 2020-07-29 | End: 2020-07-29 | Stop reason: HOSPADM

## 2020-07-29 RX ORDER — ONDANSETRON 2 MG/ML
4 INJECTION INTRAMUSCULAR; INTRAVENOUS
Status: DISCONTINUED | OUTPATIENT
Start: 2020-07-29 | End: 2020-07-29 | Stop reason: HOSPADM

## 2020-07-29 RX ORDER — MORPHINE SULFATE 2 MG/ML
2 INJECTION, SOLUTION INTRAMUSCULAR; INTRAVENOUS
Status: DISCONTINUED | OUTPATIENT
Start: 2020-07-29 | End: 2020-07-30 | Stop reason: HOSPADM

## 2020-07-29 RX ORDER — OXYCODONE HYDROCHLORIDE AND ACETAMINOPHEN 5; 325 MG/1; MG/1
1 TABLET ORAL PRN
Status: DISCONTINUED | OUTPATIENT
Start: 2020-07-29 | End: 2020-07-29 | Stop reason: HOSPADM

## 2020-07-29 RX ORDER — ROCURONIUM BROMIDE 10 MG/ML
INJECTION, SOLUTION INTRAVENOUS PRN
Status: DISCONTINUED | OUTPATIENT
Start: 2020-07-29 | End: 2020-07-29 | Stop reason: SDUPTHER

## 2020-07-29 RX ORDER — MIDAZOLAM HYDROCHLORIDE 1 MG/ML
INJECTION INTRAMUSCULAR; INTRAVENOUS PRN
Status: DISCONTINUED | OUTPATIENT
Start: 2020-07-29 | End: 2020-07-29 | Stop reason: SDUPTHER

## 2020-07-29 RX ADMIN — GABAPENTIN 300 MG: 300 CAPSULE ORAL at 06:46

## 2020-07-29 RX ADMIN — CELECOXIB 100 MG: 100 CAPSULE ORAL at 20:39

## 2020-07-29 RX ADMIN — FENTANYL CITRATE 50 MCG: 50 INJECTION INTRAMUSCULAR; INTRAVENOUS at 08:16

## 2020-07-29 RX ADMIN — Medication 2 G: at 22:31

## 2020-07-29 RX ADMIN — SENNOSIDES AND DOCUSATE SODIUM 1 TABLET: 8.6; 5 TABLET ORAL at 11:35

## 2020-07-29 RX ADMIN — CETIRIZINE HYDROCHLORIDE 10 MG: 10 TABLET ORAL at 11:35

## 2020-07-29 RX ADMIN — ONDANSETRON 4 MG: 2 INJECTION, SOLUTION INTRAMUSCULAR; INTRAVENOUS at 07:47

## 2020-07-29 RX ADMIN — HYDROMORPHONE HYDROCHLORIDE 0.5 MG: 1 INJECTION, SOLUTION INTRAMUSCULAR; INTRAVENOUS; SUBCUTANEOUS at 09:44

## 2020-07-29 RX ADMIN — LIDOCAINE HYDROCHLORIDE 0.1 ML: 10 INJECTION, SOLUTION EPIDURAL; INFILTRATION; INTRACAUDAL; PERINEURAL at 06:46

## 2020-07-29 RX ADMIN — BUPIVACAINE HYDROCHLORIDE 30 ML: 2.5 INJECTION, SOLUTION EPIDURAL; INFILTRATION; INTRACAUDAL; PERINEURAL at 07:04

## 2020-07-29 RX ADMIN — MIDAZOLAM 4 MG: 1 INJECTION INTRAMUSCULAR; INTRAVENOUS at 07:04

## 2020-07-29 RX ADMIN — LIDOCAINE HYDROCHLORIDE 30 MG: 20 INJECTION, SOLUTION INFILTRATION; PERINEURAL at 07:32

## 2020-07-29 RX ADMIN — ACETAMINOPHEN 1000 MG: 500 TABLET ORAL at 06:46

## 2020-07-29 RX ADMIN — SODIUM CHLORIDE, SODIUM LACTATE, POTASSIUM CHLORIDE, AND CALCIUM CHLORIDE: .6; .31; .03; .02 INJECTION, SOLUTION INTRAVENOUS at 06:46

## 2020-07-29 RX ADMIN — Medication 10 ML: at 22:32

## 2020-07-29 RX ADMIN — FENTANYL CITRATE 25 MCG: 50 INJECTION INTRAMUSCULAR; INTRAVENOUS at 08:00

## 2020-07-29 RX ADMIN — CELECOXIB 200 MG: 200 CAPSULE ORAL at 06:46

## 2020-07-29 RX ADMIN — GABAPENTIN 300 MG: 300 CAPSULE ORAL at 14:29

## 2020-07-29 RX ADMIN — Medication 2 G: at 15:02

## 2020-07-29 RX ADMIN — SODIUM CHLORIDE: 9 INJECTION, SOLUTION INTRAVENOUS at 11:35

## 2020-07-29 RX ADMIN — SUGAMMADEX 100 MG: 100 INJECTION, SOLUTION INTRAVENOUS at 09:10

## 2020-07-29 RX ADMIN — SENNOSIDES AND DOCUSATE SODIUM 1 TABLET: 8.6; 5 TABLET ORAL at 20:37

## 2020-07-29 RX ADMIN — ACETAMINOPHEN 650 MG: 325 TABLET ORAL at 14:29

## 2020-07-29 RX ADMIN — SODIUM CHLORIDE: 9 INJECTION, SOLUTION INTRAVENOUS at 07:58

## 2020-07-29 RX ADMIN — APIXABAN 2.5 MG: 5 TABLET, FILM COATED ORAL at 20:37

## 2020-07-29 RX ADMIN — DEXAMETHASONE SODIUM PHOSPHATE 10 MG: 4 INJECTION, SOLUTION INTRAMUSCULAR; INTRAVENOUS at 07:47

## 2020-07-29 RX ADMIN — ACETAMINOPHEN 650 MG: 325 TABLET ORAL at 20:39

## 2020-07-29 RX ADMIN — GABAPENTIN 300 MG: 300 CAPSULE ORAL at 20:36

## 2020-07-29 RX ADMIN — ROCURONIUM BROMIDE 40 MG: 10 INJECTION, SOLUTION INTRAVENOUS at 07:32

## 2020-07-29 RX ADMIN — Medication 2 G: at 07:19

## 2020-07-29 RX ADMIN — PROPOFOL 150 MG: 10 INJECTION, EMULSION INTRAVENOUS at 07:32

## 2020-07-29 RX ADMIN — SODIUM CHLORIDE, POTASSIUM CHLORIDE, SODIUM LACTATE AND CALCIUM CHLORIDE: 600; 310; 30; 20 INJECTION, SOLUTION INTRAVENOUS at 07:26

## 2020-07-29 RX ADMIN — FENTANYL CITRATE 25 MCG: 50 INJECTION INTRAMUSCULAR; INTRAVENOUS at 07:32

## 2020-07-29 RX ADMIN — OXYCODONE HYDROCHLORIDE 10 MG: 5 TABLET ORAL at 15:48

## 2020-07-29 ASSESSMENT — PULMONARY FUNCTION TESTS
PIF_VALUE: 18
PIF_VALUE: 9
PIF_VALUE: 0
PIF_VALUE: 16
PIF_VALUE: 19
PIF_VALUE: 18
PIF_VALUE: 0
PIF_VALUE: 29
PIF_VALUE: 16
PIF_VALUE: 18
PIF_VALUE: 19
PIF_VALUE: 0
PIF_VALUE: 2
PIF_VALUE: 23
PIF_VALUE: 19
PIF_VALUE: 19
PIF_VALUE: 16
PIF_VALUE: 19
PIF_VALUE: 18
PIF_VALUE: 20
PIF_VALUE: 18
PIF_VALUE: 19
PIF_VALUE: 20
PIF_VALUE: 18
PIF_VALUE: 4
PIF_VALUE: 23
PIF_VALUE: 18
PIF_VALUE: 18
PIF_VALUE: 19
PIF_VALUE: 16
PIF_VALUE: 20
PIF_VALUE: 19
PIF_VALUE: 0
PIF_VALUE: 19
PIF_VALUE: 18
PIF_VALUE: 2
PIF_VALUE: 19
PIF_VALUE: 18
PIF_VALUE: 22
PIF_VALUE: 1
PIF_VALUE: 19
PIF_VALUE: 25
PIF_VALUE: 19
PIF_VALUE: 18
PIF_VALUE: 18
PIF_VALUE: 19
PIF_VALUE: 21
PIF_VALUE: 19
PIF_VALUE: 18
PIF_VALUE: 4
PIF_VALUE: 0
PIF_VALUE: 18
PIF_VALUE: 19
PIF_VALUE: 3
PIF_VALUE: 19
PIF_VALUE: 18
PIF_VALUE: 24
PIF_VALUE: 17
PIF_VALUE: 19
PIF_VALUE: 18
PIF_VALUE: 18
PIF_VALUE: 3
PIF_VALUE: 19
PIF_VALUE: 18
PIF_VALUE: 18
PIF_VALUE: 2
PIF_VALUE: 18
PIF_VALUE: 21
PIF_VALUE: 16
PIF_VALUE: 20
PIF_VALUE: 2
PIF_VALUE: 19
PIF_VALUE: 18
PIF_VALUE: 2
PIF_VALUE: 4
PIF_VALUE: 18
PIF_VALUE: 1
PIF_VALUE: 4
PIF_VALUE: 23
PIF_VALUE: 18
PIF_VALUE: 2
PIF_VALUE: 18
PIF_VALUE: 19
PIF_VALUE: 18
PIF_VALUE: 19
PIF_VALUE: 1
PIF_VALUE: 18
PIF_VALUE: 19
PIF_VALUE: 2
PIF_VALUE: 18
PIF_VALUE: 18
PIF_VALUE: 16
PIF_VALUE: 18
PIF_VALUE: 18
PIF_VALUE: 19
PIF_VALUE: 16
PIF_VALUE: 0

## 2020-07-29 ASSESSMENT — PAIN DESCRIPTION - LOCATION
LOCATION: BUTTOCKS

## 2020-07-29 ASSESSMENT — PAIN DESCRIPTION - ORIENTATION
ORIENTATION: RIGHT

## 2020-07-29 ASSESSMENT — PAIN DESCRIPTION - FREQUENCY
FREQUENCY: CONTINUOUS

## 2020-07-29 ASSESSMENT — PAIN SCALES - GENERAL
PAINLEVEL_OUTOF10: 6
PAINLEVEL_OUTOF10: 5
PAINLEVEL_OUTOF10: 3
PAINLEVEL_OUTOF10: 8
PAINLEVEL_OUTOF10: 5
PAINLEVEL_OUTOF10: 0

## 2020-07-29 ASSESSMENT — PAIN DESCRIPTION - ONSET
ONSET: ON-GOING

## 2020-07-29 ASSESSMENT — PAIN - FUNCTIONAL ASSESSMENT
PAIN_FUNCTIONAL_ASSESSMENT: ACTIVITIES ARE NOT PREVENTED
PAIN_FUNCTIONAL_ASSESSMENT: ACTIVITIES ARE NOT PREVENTED
PAIN_FUNCTIONAL_ASSESSMENT: 0-10
PAIN_FUNCTIONAL_ASSESSMENT: 0-10
PAIN_FUNCTIONAL_ASSESSMENT: ACTIVITIES ARE NOT PREVENTED

## 2020-07-29 ASSESSMENT — PAIN DESCRIPTION - PROGRESSION
CLINICAL_PROGRESSION: NOT CHANGED
CLINICAL_PROGRESSION: GRADUALLY WORSENING
CLINICAL_PROGRESSION: NOT CHANGED

## 2020-07-29 ASSESSMENT — PAIN DESCRIPTION - PAIN TYPE
TYPE: SURGICAL PAIN

## 2020-07-29 ASSESSMENT — PAIN DESCRIPTION - DESCRIPTORS
DESCRIPTORS: ACHING

## 2020-07-29 NOTE — PROGRESS NOTES
Patient admitted to room 308-2 from PACU. Patient oriented to room, call light, bed rails, phone, lights and bathroom. Patient instructed about the schedule of the day including: vital sign frequency, lab draws, possible tests, frequency of MD and staff rounds, daily weights, I &O's and prescribed diet. Bed alarm in place, patient aware of placement and reason. 2West patient, no tele placed. Bed locked, in lowest position, side rails up 2/4, call light within reach. Recliner Assessment  Patient is not able to demonstrated the ability to move from a reclining position to an upright position within the recliner. however patient is alert, oriented and able to provide informed consent    4 Eyes Skin Assessment     The patient is being assess for   Admission    I agree that 2 RN's have performed a thorough Head to Toe Skin Assessment on the patient. ALL assessment sites listed below have been assessed. Areas assessed by both nurses:   [x]   Head, Face, and Ears   [x]   Shoulders, Back, and Chest, Abdomen  [x]   Arms, Elbows, and Hands   [x]   Coccyx, Sacrum, and Ischium  [x]   Legs, Feet, and Heels        Dry skin to heels, surgical incision to right hip. **SHARE this note so that the co-signing nurse is able to place an eSignature**    Co-signer eSignature: Electronically signed by Andie Brambila RN on 7/29/20 at 11:27 AM EDT    Does the Patient have Skin Breakdown?   No          Adam Prevention initiated:  No   Wound Care Orders initiated:  No      [facility] nurse consulted for Pressure Injury (Stage 3,4, Unstageable, DTI, NWPT, Complex wounds)and New or Established Ostomies:  No      Primary Nurse eSignature: Electronically signed by Kimberly Cui RN on 7/29/20 at 11:28 AM EDT

## 2020-07-29 NOTE — PROGRESS NOTES
Inpatient Physical Therapy Evaluation and Treatment    Unit: PCU  Date:  7/29/2020  Patient Name:    Clayton Cervantes  Admitting diagnosis:  H/O total hip arthroplasty, right [Z96.641]  Admit Date:  7/29/2020  Precautions/Restrictions/WB Status/ Lines/ Wounds/ Oxygen: Fall risk, Bed/chair alarm, Lines -IV and Supplemental O2 (2), Telemetry and WB Restrictions (PWB/ 50% weight bearing on the RLE)   No hip adduction beyond neutral, no external rotation and hyperextension on R hip with abductor pillow  S/P R THR (pre-op diagnosis: Avascular necrosis of the R hip) on 7/29/2020    Treatment Time: 1352 - 7120  Treatment Number:  1   Timed Code Treatment Minutes: 28 minutes + Eval  Total Treatment Minutes:  38  minutes    Patient Goals for Therapy: \" To go home \"          Discharge Recommendations: Home 24 hr assist and with home PT   DME needs for discharge: RW       Therapy recommendation for EMS Transport: can transport by wheelchair    Therapy recommendations for staff:   Assist of 1 with use of rolling walker (RW) for all transfers and ambulation to/from [facility]  to/from chair  within room    History of Present Illness: Patient is 64 yrs old female who underwent R THR due to Avascular necrosis diagnosis on the R hip on 7/29/2020. Home Health S4 Level Recommendation:  Level 1 Standard  AM-PAC Mobility Score    AM-PAC Inpatient Mobility Raw Score : 17     Preadmission Environment    Pt. Lives Alone (Patient is planning to stay with her for first 2 weeks)  Home environment: basement apartment   Steps to enter first floor: flight of 7-8 steps to go down with unilateral HR and one side wall support, back side entrance 3-4 steps to enter with unilateral HR with wall support   Steps to second floor: N/A  Bathroom: tub/shower unit, comfort height toilet and shower chair with back support.   Equipment owned: , [facility], reacher and raised toilet    Preadmission Status:  Pt. Able to drive: has driving license but doens not own a car  Pt Fully independent with ADLs: No since last 1 month patient needed 1 person assist for all ADLS. 1 month ago patient was fully independent in all functional mobility. Pt. Required assistance from family for: Bathing, Cleaning, Cooking, Dressing and Laundry   Pt. independent for transfers and gait and walked with SW. Patient needed 1 person assist for transfers with one person assist.  History of falls No    Pain   Yes  Location: R buttock  Ratin /10  Pain Medicine Status: RN notified    Cognition    A&O x4   Able to follow 2 step commands    Subjective  Patient lying supine in bed with no family present. Pt agreeable to this PT eval & tx. Upper Extremity ROM/Strength  Please see OT evaluation. Lower Extremity ROM / Strength   AROM WFL: Yes  ROM limitations: R Hip ROM could not be assessed due to R hip precautions post THR. Strength Assessment (measured on a 0-5 scale):  R LE   Quad   3+   Ant Tib  5/5   Hamstring Deferred    Iliopsoas Deferred   L LE  Quad   4   Ant Tib  5/5   Hamstring 4   Iliopsoas 4    Lower Extremity Sensation    WFL    Lower Extremity Proprioception:   WFL    Coordination and Tone  WFL    Balance  Sitting:  Good ; Supervision  Comments:     Standing: Fair -; CGA  Comments: 5 min    Bed Mobility   Supine to Sit:    Min A   Sit to Supine:   Not Tested  Rolling:   Not Tested  Scooting in sitting: CGA  Scooting in supine:  Not Tested    Transfer Training     Sit to stand:   CGA  Stand to sit:   CGA  Bed to Chair:   CGA with use of rolling walker (RW)    Gait gait completed as indicated below  Distance:      10 ft  Deviations (firm surface/linoleum):  decreased micha, increased JOE, step to pattern, antalgic pattern and decreased step length bilaterally  Assistive Device Used:    rolling walker (RW)  Level of Assist:    CGA  Comment: with PWB on the RLE.     Stair Training deferred, pt unsafe/ not appropriate to complete stairs at this time    Activity Tolerance   Pt steps with Min A  with use of hand rail unilateral and LRAD (least restrictive assistive device)    Rehabilitation Potential: Good  Strengths for achieving goals include:   Pt motivated, PLOF, Family Support and Pt cooperative   Barriers to achieving goals include:    No Barriers    Plan    To be seen 5-7 x / week while in acute care setting for therapeutic exercises, bed mobility, transfers, progressive gait training, balance training, and family/patient education. Signature: Josh Smalls MS PT, # [id number]      If patient discharges from this facility prior to next visit, this note will serve as the Discharge Summary.

## 2020-07-29 NOTE — PROGRESS NOTES
Good pedal pulse palpable to right foot. Patient asleep and awakens to verbal stimuli and states pain is easing 6/10.

## 2020-07-29 NOTE — ANESTHESIA POSTPROCEDURE EVALUATION
Department of Anesthesiology  Postprocedure Note    Patient: Sheryl Tracey  MRN: [id number]  YOB: 1960  Date of evaluation: 7/29/2020  Time:  6:10 PM     Procedure Summary     Date:  07/29/20 Room / Location:  [address] [place] [address] / [facility]    Anesthesia Start:  2340 Anesthesia Stop:  0587    Procedure:  RIGHT TOTAL HIP REPLACEMENT (Right Hip) Diagnosis:  (AVASCULAR NECROSIS RIGHT HIP)    Surgeon:  Britney Mcclure MD Responsible Provider:  Buddy Nelson MD    Anesthesia Type:  general ASA Status:  3          Anesthesia Type: general    Tamie Phase I: Tamie Score: 7    Tamie Phase II:      Last vitals: Reviewed and per EMR flowsheets. Anesthesia Post Evaluation    Patient location during evaluation: PACU  Patient participation: complete - patient participated  Level of consciousness: awake and alert  Airway patency: patent  Nausea & Vomiting: no nausea and no vomiting  Complications: no  Cardiovascular status: blood pressure returned to baseline  Respiratory status: acceptable  Hydration status: euvolemic  Comments: VSS on transfer to phase 2 recovery. No anesthetic complications.

## 2020-07-29 NOTE — PROGRESS NOTES
.Patient pre-op admission complete see flow sheet. IV started. IV antibiotic pulled and placed with chart T&S screen obtained and sent to lab. Safety checks complete. Call light at bedside.

## 2020-07-29 NOTE — H&P
I have reviewed the history and physical and examined the patient and find no relevant changes. I have reviewed with the patient and/or family the risks, benefits, and alternatives to the procedure. Paper H&P in chart from pcp on 7/24/20    Controlled Substance Monitoring:    Acute and Chronic Pain Monitoring:   RX Monitoring 7/29/2020   Acute Pain Prescriptions Severe pain not adequately treated with lower dose. Periodic Controlled Substance Monitoring No signs of potential drug abuse or diversion identified. Patient being given increased dosage/quantity of opoid pain medication in excess of OSMB guidelines which noted a 30 MED daily of opioids due to the fact that he/she has undergone major orthopaedic surgery as outlined in rule 4731-11-13. Dosages and further duration of the pain medication will be re-evaluated at her post op visit in 2 weeks. Patient was educated on dosing expectations and limits of prescribing as a result of the new [facility] Board rules enacted August 31, 2017. Please also note that this is not the initial opoid prescription issued to this patient but a continuation of medication utilized during the hospital admission as noted in the medical record. [place]ARRS report has also been utilized to screen for any abuse history or suspicious activity as outlined in [place]. All efforts have been taken to prevent abuse potential and misuse of opioid medications including education, screening, and close clinical follow up.

## 2020-07-29 NOTE — PROGRESS NOTES
Inpatient Occupational Therapy  Evaluation and Treatment    Unit: PCU  Date:  7/29/2020  Patient Name:    Tommy Monreal  Admitting diagnosis:  H/O total hip arthroplasty, right [Z96.641]  Admit Date:  7/29/2020  Precautions/Restrictions/WB Status/ Lines/ Wounds/ Oxygen: Fall risk, Bed/chair alarm, Lines -IV, Telemetry and WB Restrictions (PWB/50% WB on RLE)     No hip ADD beyond neutral, no external rotation and hyperextension on R hip with abductor pillow    S/p R THR (preop diagnosis: avascular necrosis of R hip) on 7/29/20     Treatment Time:  4546-9173  Treatment Number: 1   Timed code treatment minutes 0 minutes   Total Treatment minutes:   15   Minutes eval    Patient Goals for Therapy:  \" go home, my daughter will help me \"      Discharge Recommendations: Home 24 hr assist, with home PT and with home OT   DME needs for discharge: RW       Therapy recommendations for staff:   Assist of 1 with use of rolling walker (RW) for all transfers to/from [facility]  to/from chair  within room    History of Present Illness: Patient is 64 yrs old female who underwent R THR due to Avascular necrosis diagnosis on the R hip on 7/29/2020. Home Health S4 Level Recommendation:  Level 1 Standard  AM-PAC Score: AM-PAC Inpatient Daily Activity Raw Score: 17    Preadmission Environment    Pt. Lives Alone (Patient is planning to stay with her for first 2 weeks)  Home environment: basement apartment   Steps to enter first floor: flight of 7-8 steps to go down with unilateral HR and one side wall support, back side entrance 3-4 steps to enter with unilateral HR with wall support   Steps to second floor: N/A  Bathroom: tub/shower unit, comfort height toilet and shower chair with back support. Equipment owned: [email or web address], reacher and raised toilet     Preadmission Status:  Pt. Able to drive: has driving license but doens not own a car  Pt Fully independent with ADLs: No since last 1 month patient needed 1 person assist for all ADLS.  1 pressure relief provided. Call light provided and all needs within reach    Exercise / Activities Initiated: Gloria deferred secondary to pt fatigued and in pain  N/A    Patient/Family Education:   Role of OT  Recommendations for DC  Safe RW use/hand placement    Assessment of Deficits: Pt seen for Occupational therapy evaluation in acute care setting. Pt demonstrated decreased Activity tolerance, ADLs, IADLs, Balance , Bed mobility, Strength and Transfers. Pt functioning below baseline and will likely benefit from skilled occupational therapy services to maximize safety and independence. Goal(s) : To be met in 3 Visits:  1). Bed to toilet/BSC: Modified Independent    To be met in 5 Visits:  1). Supine to/from Sit:  Supervision  2). Upper Body Bathing:   Supervision  3). Lower Body Bathing:   CGA  4). Upper Body Dressing:  Supervision  5). Lower Body Dressing:  CGA  6). Pt to demonstrate UE exs x 15 reps with minimal cues    Rehabilitation Potential:  Good for goals listed above. Strengths for achieving goals include: Pt motivated, PLOF, Family Support and Pt cooperative  Barriers to achieving goals include:  Pain     Plan: To be seen 3-5 x/wk while in acute care setting for therapeutic exercises, bed mobility, transfers, dressing, bathing, family/patient education, ADL/IADL retraining, energy conservation training.      Juma Girard, OTR/L [id number]            If patient discharges from this facility prior to next visit, this note will serve as the Discharge Summary

## 2020-07-29 NOTE — PROGRESS NOTES
Patient is resting showing no s/s of distress. Patient is denying any needs. Bed is in lowest position and call light is within reach. Will continue to monitor. Shift assessment complete, see flowsheets.

## 2020-07-29 NOTE — PROGRESS NOTES
Arrived from OR awakens easily and respirations unlabored. Report received from Michelle Fuller RN. Patient has abductor pillow between legs and dressing to right lateral hip dry and intact. Pedal pulse palpable with brisk capillary refill. Ice pack applied to right hip and mistrel air applied.

## 2020-07-29 NOTE — ANESTHESIA PRE PROCEDURE
Department of Anesthesiology  Preprocedure Note       Name:  Jeffry Grissom   Age:  64 y.o.  :  1960                                          MRN:  [id number]         Date:  2020      Surgeon: Milagros Bal):  Stefano Marquez MD    Procedure: Procedure(s):  RIGHT TOTAL HIP REPLACEMENT    Medications prior to admission:   Prior to Admission medications    Medication Sig Start Date End Date Taking?  Authorizing Provider   FLUoxetine (PROZAC) 20 MG capsule Take 20 mg by mouth daily   Yes Historical Provider, MD   ibuprofen (ADVIL;MOTRIN) 800 MG tablet Take 800 mg by mouth every 6 hours as needed for Pain   Yes Historical Provider, MD   hydrochlorothiazide (HYDRODIURIL) 12.5 MG tablet Take 12.5 mg by mouth daily  18  Yes Historical Provider, MD   cetirizine (ZYRTEC) 10 MG tablet Take 10 mg by mouth daily  18  Yes Historical Provider, MD   albuterol sulfate  (90 Base) MCG/ACT inhaler Inhale 2 puffs into the lungs every 6 hours as needed for Wheezing    Historical Provider, MD       Current medications:    Current Facility-Administered Medications   Medication Dose Route Frequency Provider Last Rate Last Dose    ceFAZolin (ANCEF) 2 g in sterile water 20 mL IV syringe  2 g Intravenous Once Stefano Marquez MD        celecoxib (CELEBREX) capsule 100 mg  100 mg Oral BID Alysia Wheeler MD        gabapentin (NEURONTIN) capsule 300 mg  300 mg Oral TID Alysia Wheeler MD        lactated ringers infusion   Intravenous Continuous Alysia Wheeler MD        lactated ringers infusion   Intravenous Continuous Lea Garcia  mL/hr at 20 0646      sodium chloride flush 0.9 % injection 10 mL  10 mL Intravenous 2 times per day Lea Garcia MD        sodium chloride flush 0.9 % injection 10 mL  10 mL Intravenous PRN Lea Garcia MD        lidocaine PF 1 % injection 0.3 mL  0.3 mL Intradermal Once PRN Lea Garcia MD        midazolam (VERSED) 2 MG/2ML injection                Allergies:  No Known Allergies    Problem List:    Patient Active Problem List   Diagnosis Code    History of total right hip replacement Z96.641       Past Medical History:        Diagnosis Date    Arthritis     Asthma     Depression     Hypertension        Past Surgical History:        Procedure Laterality Date     SECTION      HYSTERECTOMY      [facility] NJX DX/THER SBST INTRLMNR CRV/THRC W/IMG GDN Bilateral 2018    BILATERAL LUMBAR FIVE TRANSFORAMINAL EPIDURAL STEROID INJECTION SITE CONFIRMED BY FLUOROSCOPY performed by Lee Mendez MD at [facility] DX/THER SBST INTRLMNR CRV/THRC W/IMG GDN N/A 10/16/2018    RIGHT LUMBAR THREE AND LEFT LUMBAR FIVE TRANSFORAMINAL EPIDURAL STEROID INJECTION SITE CONFIRMED BY FLUOROSCOPY performed by Lee Mendez MD at [facility] DX/THER SBST INTRLMNR CRV/THRC W/IMG GDN Right 10/30/2018    RIGHT LUMBAR THREE AND LEFT LUMBAR FIVE TRANSFORAMINAL EPIDURAL STEROID INJECTION SITE CONFIRMED BY FLUOROSCOPY performed by Lee Mendez MD at [place] [address]       Social History:    Social History     Tobacco Use    Smoking status: Former Smoker    Smokeless tobacco: Never Used   Substance Use Topics    Alcohol use:  No                                Counseling given: Not Answered      Vital Signs (Current):   Vitals:    20 1506 20 0635 20 0636   BP:  122/72 123/66   Pulse:  73    Resp:  16    Temp:  96.8 °F (36 °C)    TempSrc:  Temporal    SpO2:  96%    Weight: 190 lb (86.2 kg) 190 lb (86.2 kg)    Height: 5' 2\" (1.575 m) 5' 2\" (1.575 m)                                               BP Readings from Last 3 Encounters:   20 123/66   18 130/78   10/30/18 (!) 144/83       NPO Status: Time of last liquid consumption: 515                        Time of last solid consumption:                         Date of last liquid consumption: 20(sip water AM meds) Date of last solid food consumption: 07/28/20    BMI:   Wt Readings from Last 3 Encounters:   07/29/20 190 lb (86.2 kg)   06/22/20 169 lb 15.6 oz (77.1 kg)   12/11/18 169 lb 15.6 oz (77.1 kg)     Body mass index is 34.75 kg/m². CBC:   Lab Results   Component Value Date    WBC 8.3 07/14/2020    RBC 4.53 07/14/2020    HGB 13.3 07/14/2020    HCT 39.9 07/14/2020    MCV 88.1 07/14/2020    RDW 14.9 07/14/2020     07/14/2020       CMP:   Lab Results   Component Value Date     07/14/2020    K 3.3 07/14/2020    CL 96 07/14/2020    CO2 27 07/14/2020    BUN 17 07/14/2020    CREATININE 0.7 07/14/2020    GFRAA >60 07/14/2020    LABGLOM >60 07/14/2020    GLUCOSE 117 07/14/2020    CALCIUM 10.0 07/14/2020       POC Tests: No results for input(s): POCGLU, POCNA, POCK, POCCL, POCBUN, POCHEMO, POCHCT in the last 72 hours.     Coags:   Lab Results   Component Value Date    PROTIME 12.7 07/14/2020    INR 1.09 07/14/2020    APTT 28.0 07/14/2020       HCG (If Applicable): No results found for: PREGTESTUR, PREGSERUM, HCG, HCGQUANT     ABGs: No results found for: PHART, PO2ART, TEW6VMN, UBL9SOV, BEART, E4LMASUH     Type & Screen (If Applicable):  No results found for: LABABO, LABRH    Drug/Infectious Status (If Applicable):  No results found for: HIV, HEPCAB    COVID-19 Screening (If Applicable):   Lab Results   Component Value Date    COVID19 Not Detected 07/23/2020         Anesthesia Evaluation   no history of anesthetic complications:   Airway: Mallampati: II  TM distance: <3 FB   Neck ROM: full  Mouth opening: > = 3 FB Dental: normal exam         Pulmonary:   (+) asthma:                           ROS comment: H/o tob   Cardiovascular:    (+) hypertension:,                   Neuro/Psych:   (+) psychiatric history:depression/anxiety             GI/Hepatic/Renal: Neg GI/Hepatic/Renal ROS            Endo/Other:    (+) : arthritis: OA., .                 Abdominal:           Vascular: Anesthesia Plan      general     ASA 3     (Risks, benefits and alternatives of GETA for operative care and ultrasound guided fascia iliaca compartment nerve block for post operative analgesia discussed with pt. All questions answered. Willing to proceed.)  Induction: intravenous. Anesthetic plan and risks discussed with patient.                       Kei Keene MD   7/29/2020

## 2020-07-29 NOTE — ANESTHESIA PROCEDURE NOTES
Peripheral Block    Patient location during procedure: pre-op  Staffing  Anesthesiologist: Karyle Aliment, MD  Performed: anesthesiologist   Preanesthetic Checklist  Completed: patient identified, site marked, surgical consent, pre-op evaluation, timeout performed, IV checked, risks and benefits discussed, monitors and equipment checked, anesthesia consent given, oxygen available and patient being monitored  Peripheral Block  Patient position: supine  Prep: ChloraPrep  Patient monitoring: continuous pulse ox and IV access  Block type: Fascia iliaca  Laterality: right  Injection technique: single-shot  Procedures: ultrasound guided  Local infiltration: lidocaine  Infiltration strength: 1 %  Dose: 3 mL  Provider prep: mask and sterile gloves  Local infiltration: lidocaine  Needle  Needle type: combined needle/nerve stimulator   Needle gauge: 21 G  Needle length: 10 cm  Needle localization: ultrasound guidance  Assessment  Injection assessment: negative aspiration for heme, no paresthesia on injection and local visualized surrounding nerve on ultrasound  Paresthesia pain: none  Slow fractionated injection: yes  Hemodynamics: stable  Additional Notes  Immediately prior to procedure a \"time out\" was called to verify the correct patient, allergies, laterality, procedure and equipment. Time out performed with  RN    Local Anesthetic: 0.125 %  Bupivacaine  Plus epi 1 to 200K Amount: 60 ml  in 5 ml increments after negative aspiration each time.     Reason for block: post-op pain management and at surgeon's request

## 2020-07-29 NOTE — BRIEF OP NOTE
Brief Postoperative Note      Patient: Gary Fisher  YOB: 1960  MRN: [id number]    Date of Procedure: 7/29/2020    Pre-Op Diagnosis: AVASCULAR NECROSIS RIGHT HIP    Post-Op Diagnosis: Same       Procedure(s):  RIGHT TOTAL HIP REPLACEMENT    Surgeon(s):  Vidhya Renner MD    Assistant:  * No surgical staff found *    Anesthesia: General    Estimated Blood Loss (mL): 848     Complications: None    Specimens:   * No specimens in log *    Implants:  Implant Name Type Inv.  Item Serial No.  Lot No. LRB No. Used Action   IMPL HIP REF [facility] HOLE COVER - [id number] Hip IMPL HIP REF [facility] HOLE COVER [id number] [facility] [id number] Right 1 Implanted   SCREW HIP [facility] SOUTH HEAD 6.5X25MM - [id number] Screw/Plate/Nail/Epifanio SCREW HIP [facility] SOUTH HEAD 6.5X25MM [id number] Right 2 Implanted   IMPL HIP SHLL ACET R3 3HL CHRIS SZ 52MM - [id number] Hip IMPL HIP SHLL ACET R3 3HL CHRIS SZ 52MM [id number] [employer] [id number] Right 1 Implanted   IMPL HIP LINER ACET R3 20DEG 18A50GC - [id number] Hip IMPL HIP LINER ACET R3 20DEG 66B22AH [id number] [employer] [id number] Right 1 Implanted   IMPL HIP RE SCREW HL COVER - [id number] Hip IMPL HIP RE SCREW HL COVER [id number] [facility] [id number] Right 1 Implanted   IMPL HIP FEM HEAD OXINIUM 12TO14 TAPR 0 - [id number] Hip IMPL HIP FEM HEAD OXINIUM 12TO14 TAPR 0 [id number] [employer] [id number] Right 1 Implanted   IMPL HIP FEM STEM SYN PORS SZ 14 160MM - [id number] Hip IMPL HIP FEM STEM SYN PORS SZ 14 160MM [id number] [employer] [id number] Right 1 Implanted         Drains: * No LDAs found *    Findings: right BAIRON    Electronically signed by LUZ MARIA Corona on 7/29/2020 at 10:13 AM

## 2020-07-29 NOTE — PROGRESS NOTES
. 4 Eyes Skin Assessment   Four eyes skin assessment completed at this time by Aroldo Peng, DERICK   and NELLA Knight RN. Assessment revealed: Intact skin very dry and flaky and cracked skin on bilateral feet    The patient is being assessed for  Admission    I agree that 2 RN's have performed a thorough Head to Toe Skin Assessment on the patient. ALL assessment sites listed below have been assessed.        Areas assessed by both nurses:  [x]   Head, Face, and Ears   [x]   Shoulders, Back, and Chest  [x]   Arms, Elbows, and Hands   [x]   Coccyx, Sacrum, and Ischum  [x]   Legs, Feet, and Erskin Xiomara RN

## 2020-07-29 NOTE — CONSULTS
Internal Medicine Consult Note    PCP: [facility]    Date of Admission: 2020    Date of Service: Pt seen/examined on 2020     Reason for Consult:   Medical management    History Of Present Illness: The patient is a 64 y.o. female with hypertension, depression, asthma, and arthritis who presents to [facility] for right total hip arthroplasty with Dr. Britany Finn for Avascular necrosis. Her pain is controlled. Denies any fever, chills, cough, chest pain, dyspnea, abdominal pain, nausea, vomiting, diarrhea, or dysuria. Her vitals are stable. She is on 2 L O2. Patient admitted for observation. She plans on going home at discharge. Past Medical History:        Diagnosis Date    Arthritis     Asthma     Depression     Hypertension        Past Surgical History:        Procedure Laterality Date     SECTION      HYSTERECTOMY      OTHER SURGICAL HISTORY  2020    right total hip replacement    [place] NJX DX/THER SBST INTRLMNR CRV/THRC W/IMG GDN Bilateral 2018    BILATERAL LUMBAR FIVE TRANSFORAMINAL EPIDURAL STEROID INJECTION SITE CONFIRMED BY FLUOROSCOPY performed by Billy Garcia MD at [facility] DX/THER SBST INTRLMNR CRV/THRC W/IMG GDN N/A 10/16/2018    RIGHT LUMBAR THREE AND LEFT LUMBAR FIVE TRANSFORAMINAL EPIDURAL STEROID INJECTION SITE CONFIRMED BY FLUOROSCOPY performed by Billy Garcia MD at [facility] DX/THER SBST INTRLMNR CRV/THRC W/IMG GDN Right 10/30/2018    RIGHT LUMBAR THREE AND LEFT LUMBAR FIVE TRANSFORAMINAL EPIDURAL STEROID INJECTION SITE CONFIRMED BY FLUOROSCOPY performed by Billy Garcia MD at [place] [address]       Medications Prior to Admission:    Prior to Admission medications    Medication Sig Start Date End Date Taking?  Authorizing Provider   FLUoxetine (PROZAC) 20 MG capsule Take 20 mg by mouth daily   Yes Historical Provider, MD   ibuprofen (ADVIL;MOTRIN) 800 MG tablet Take 800 mg by mouth every 6 hours as needed for Pain   Yes Historical Provider, MD   hydrochlorothiazide (HYDRODIURIL) 12.5 MG tablet Take 12.5 mg by mouth daily  5/8/18  Yes Historical Provider, MD   cetirizine (ZYRTEC) 10 MG tablet Take 10 mg by mouth daily  5/8/18  Yes Historical Provider, MD   albuterol sulfate  (90 Base) MCG/ACT inhaler Inhale 2 puffs into the lungs every 6 hours as needed for Wheezing    Historical Provider, MD       Allergies:  Patient has no known allergies. Social History:    TOBACCO:   reports that she has quit smoking. She has never used smokeless tobacco.  ETOH:   reports no history of alcohol use. Family History:   Positive as follows:    History reviewed. No pertinent family history. REVIEW OF SYSTEMS:       Constitutional: Negative for fever   Respiratory: Negative  for dyspnea, cough   Cardiovascular: Negative for chest pain   Gastrointestinal: Negative for vomiting, diarrhea   Genitourinary: Negative for dysuria   Musculoskeletal: Negative for arthralgias   Skin: Negative for rash   Psychiatric/Behavorial: Negative for anxiety    PHYSICAL EXAM:    /88   Pulse 68   Temp 97.9 °F (36.6 °C) (Oral)   Resp 16   Ht 5' 2\" (1.575 m)   Wt 190 lb (86.2 kg)   SpO2 97%   BMI 34.75 kg/m²     Gen: No distress. Alert. Eyes: PERRL. No sclera icterus. No conjunctival injection. ENT: No discharge. Pharynx clear. Neck: No JVD. No Carotid Bruit. Trachea midline. Resp: No accessory muscle use. No crackles. No wheezes. No rhonchi. CV: Regular rate. Regular rhythm. No murmur. No rub. No edema. GI: Non-tender. Non-distended. Normal bowel sounds. No hernia. Skin: Warm and dry. No nodule on exposed extremities. No rash on exposed extremities. M/S: No cyanosis. No joint deformity. No clubbing. + right hip dressing  Neuro: Awake. Grossly nonfocal    Psych: Oriented x 3. No anxiety or agitation.        U/A:    Lab Results   Component Value Date    COLORU Yellow 07/14/2020    WBCUA 0-2 07/14/2020 RBCUA None seen 07/14/2020    MUCUS 3+ 07/14/2020    BACTERIA 2+ 07/14/2020    CLARITYU SL CLOUDY 07/14/2020    SPECGRAV 1.025 07/14/2020    LEUKOCYTESUR Negative 07/14/2020    BLOODU SMALL 07/14/2020    GLUCOSEU Negative 07/14/2020    AMORPHOUS 2+ 07/14/2020       RADIOLOGY  XR HIP RIGHT (1 VIEW)   Preliminary Result   No acute complication status post right hip arthroplasty. Active Problems:    History of total right hip replacement  Resolved Problems:    * No resolved hospital problems. *        I SERGIO VENTURA have reviewed the chart on [address] and personally interviewed and examined patient, reviewed the data (labs and imaging) and after discussion with my PA formulated the plan. Agree with note with the following edits. HPI:     Patient is a pleasant 30-year-old white female with a history of asthma, hypertension, depression, arthritis who has bilateral avascular necrosis of the hip. She had right hip replacement today. Today's postop day 1. No chest pain or shortness breath. No abdominal pain nausea vomiting. Pain is under fair control. I saw her when she was eating lunch. I reviewed the patient's Past Medical History, Past Surgical History, Medications, and Allergies. Physical exam:    /68   Pulse 76   Temp 97.6 °F (36.4 °C) (Oral)   Resp 16   Ht 5' 2\" (1.575 m)   Wt 190 lb (86.2 kg)   SpO2 91%   BMI 34.75 kg/m²     Gen: No distress. Alert. Eyes: PERRL. No sclera icterus. No conjunctival injection. ENT: No discharge. Pharynx clear. Neck: Trachea midline. Normal thyroid. Resp: No accessory muscle use. No crackles. No wheezes. No rhonchi. No dullness on percussion. CV: Regular rate. Regular rhythm. No murmur or rub. No edema. ASSESSMENT/PLAN:  Avascular necrosis  - s/p right total hip arthroplasty with Dr. Reggy Frankel  - POD #0  - we have been consulted for medical management  - PT/OT; IS  - IVFs.    - Eliquis for [facility]  - continue Celebrex, Gabapentin  - Morphine, Oxycodone IR as needed for pain  - PT/OT  - plans on going home at d/c    Hypertension  - BP stable. Continue HCTZ. Depression  - continue Proxac. Asthma, mild intermittent  - stable. No AE. Obesity  - Body mass index is 34.75 kg/m².   - Recommended weight loss    DVT Prophylaxis: Eliquis  Diet: DIET GENERAL;  Code Status: Full Code    Gina RODRÍGUEZ  7/29/2020     SERGIO Lamas 7/29/2020 4:01 PM

## 2020-07-30 VITALS
SYSTOLIC BLOOD PRESSURE: 115 MMHG | TEMPERATURE: 97.8 F | WEIGHT: 183.3 LBS | HEART RATE: 83 BPM | DIASTOLIC BLOOD PRESSURE: 54 MMHG | OXYGEN SATURATION: 96 % | RESPIRATION RATE: 18 BRPM | HEIGHT: 62 IN | BODY MASS INDEX: 33.73 KG/M2

## 2020-07-30 LAB
ANION GAP SERPL CALCULATED.3IONS-SCNC: 10 MMOL/L (ref 3–16)
BUN BLDV-MCNC: 18 MG/DL (ref 7–20)
CALCIUM SERPL-MCNC: 8.8 MG/DL (ref 8.3–10.6)
CHLORIDE BLD-SCNC: 97 MMOL/L (ref 99–110)
CO2: 29 MMOL/L (ref 21–32)
CREAT SERPL-MCNC: 0.7 MG/DL (ref 0.6–1.1)
GFR AFRICAN AMERICAN: >60
GFR NON-AFRICAN AMERICAN: >60
GLUCOSE BLD-MCNC: 201 MG/DL (ref 70–99)
HCT VFR BLD CALC: 33.4 % (ref 36–48)
HEMOGLOBIN: 10.8 G/DL (ref 12–16)
MAGNESIUM: 2.3 MG/DL (ref 1.8–2.4)
MCH RBC QN AUTO: 28.2 PG (ref 26–34)
MCHC RBC AUTO-ENTMCNC: 32.3 G/DL (ref 31–36)
MCV RBC AUTO: 87.4 FL (ref 80–100)
PDW BLD-RTO: 14.9 % (ref 12.4–15.4)
PLATELET # BLD: 377 K/UL (ref 135–450)
PMV BLD AUTO: 7.8 FL (ref 5–10.5)
POTASSIUM REFLEX MAGNESIUM: 3.5 MMOL/L (ref 3.5–5.1)
RBC # BLD: 3.82 M/UL (ref 4–5.2)
SODIUM BLD-SCNC: 136 MMOL/L (ref 136–145)
WBC # BLD: 9.4 K/UL (ref 4–11)

## 2020-07-30 PROCEDURE — 85027 COMPLETE CBC AUTOMATED: CPT

## 2020-07-30 PROCEDURE — 97530 THERAPEUTIC ACTIVITIES: CPT

## 2020-07-30 PROCEDURE — 97116 GAIT TRAINING THERAPY: CPT

## 2020-07-30 PROCEDURE — 97110 THERAPEUTIC EXERCISES: CPT

## 2020-07-30 PROCEDURE — 2580000003 HC RX 258: Performed by: PHYSICIAN ASSISTANT

## 2020-07-30 PROCEDURE — 80048 BASIC METABOLIC PNL TOTAL CA: CPT

## 2020-07-30 PROCEDURE — 83735 ASSAY OF MAGNESIUM: CPT

## 2020-07-30 PROCEDURE — G0378 HOSPITAL OBSERVATION PER HR: HCPCS

## 2020-07-30 PROCEDURE — 6370000000 HC RX 637 (ALT 250 FOR IP): Performed by: PHYSICIAN ASSISTANT

## 2020-07-30 PROCEDURE — 36415 COLL VENOUS BLD VENIPUNCTURE: CPT

## 2020-07-30 RX ORDER — OXYCODONE HYDROCHLORIDE AND ACETAMINOPHEN 5; 325 MG/1; MG/1
1-2 TABLET ORAL
Qty: 40 TABLET | Refills: 0 | Status: SHIPPED | OUTPATIENT
Start: 2020-07-30 | End: 2020-08-05 | Stop reason: SDUPTHER

## 2020-07-30 RX ORDER — HYDROCHLOROTHIAZIDE 25 MG/1
12.5 TABLET ORAL DAILY
Status: DISCONTINUED | OUTPATIENT
Start: 2020-07-31 | End: 2020-07-30 | Stop reason: HOSPADM

## 2020-07-30 RX ADMIN — CELECOXIB 100 MG: 100 CAPSULE ORAL at 08:08

## 2020-07-30 RX ADMIN — ACETAMINOPHEN 650 MG: 325 TABLET ORAL at 08:07

## 2020-07-30 RX ADMIN — OXYCODONE HYDROCHLORIDE 10 MG: 5 TABLET ORAL at 10:33

## 2020-07-30 RX ADMIN — APIXABAN 2.5 MG: 5 TABLET, FILM COATED ORAL at 08:07

## 2020-07-30 RX ADMIN — ACETAMINOPHEN 650 MG: 325 TABLET ORAL at 03:21

## 2020-07-30 RX ADMIN — CETIRIZINE HYDROCHLORIDE 10 MG: 10 TABLET ORAL at 08:07

## 2020-07-30 RX ADMIN — SENNOSIDES AND DOCUSATE SODIUM 1 TABLET: 8.6; 5 TABLET ORAL at 08:08

## 2020-07-30 RX ADMIN — FLUOXETINE 20 MG: 20 CAPSULE ORAL at 08:07

## 2020-07-30 RX ADMIN — GABAPENTIN 300 MG: 300 CAPSULE ORAL at 08:07

## 2020-07-30 RX ADMIN — Medication 10 ML: at 08:07

## 2020-07-30 RX ADMIN — OXYCODONE HYDROCHLORIDE 10 MG: 5 TABLET ORAL at 02:39

## 2020-07-30 ASSESSMENT — PAIN SCALES - GENERAL
PAINLEVEL_OUTOF10: 3
PAINLEVEL_OUTOF10: 6
PAINLEVEL_OUTOF10: 8
PAINLEVEL_OUTOF10: 3
PAINLEVEL_OUTOF10: 8

## 2020-07-30 ASSESSMENT — PAIN DESCRIPTION - LOCATION: LOCATION: HIP

## 2020-07-30 ASSESSMENT — PAIN DESCRIPTION - PROGRESSION: CLINICAL_PROGRESSION: GRADUALLY WORSENING

## 2020-07-30 ASSESSMENT — PAIN - FUNCTIONAL ASSESSMENT: PAIN_FUNCTIONAL_ASSESSMENT: PREVENTS OR INTERFERES SOME ACTIVE ACTIVITIES AND ADLS

## 2020-07-30 ASSESSMENT — PAIN DESCRIPTION - ORIENTATION: ORIENTATION: RIGHT

## 2020-07-30 ASSESSMENT — PAIN DESCRIPTION - PAIN TYPE
TYPE: SURGICAL PAIN
TYPE: SURGICAL PAIN

## 2020-07-30 ASSESSMENT — PAIN DESCRIPTION - ONSET: ONSET: ON-GOING

## 2020-07-30 ASSESSMENT — PAIN DESCRIPTION - DESCRIPTORS: DESCRIPTORS: ACHING

## 2020-07-30 ASSESSMENT — PAIN DESCRIPTION - FREQUENCY: FREQUENCY: CONTINUOUS

## 2020-07-30 NOTE — PROGRESS NOTES
Shift assessment complete. Scheduled medications given at this time. Pt stated no needs. Call light within reach. Will continue to monitor.

## 2020-07-30 NOTE — DISCHARGE SUMMARY
Department of Orthopedic Surgery  Physician Assistant   Discharge Summary      The Radha Patricia is a 64 y.o. female underwent total hip replacement procedure without complication. Radha Patricia was admitted to the floor following their recovery in the PACU. Discharge Diagnosis  right Hip Replacement    Current Inpatient Medications    Current Facility-Administered Medications: [START ON 7/31/2020] hydroCHLOROthiazide (HYDRODIURIL) tablet 12.5 mg, 12.5 mg, Oral, Daily  celecoxib (CELEBREX) capsule 100 mg, 100 mg, Oral, BID  gabapentin (NEURONTIN) capsule 300 mg, 300 mg, Oral, TID  cetirizine (ZYRTEC) tablet 10 mg, 10 mg, Oral, Daily  FLUoxetine (PROZAC) capsule 20 mg, 20 mg, Oral, Daily  sodium chloride flush 0.9 % injection 10 mL, 10 mL, Intravenous, 2 times per day  sodium chloride flush 0.9 % injection 10 mL, 10 mL, Intravenous, PRN  acetaminophen (TYLENOL) tablet 650 mg, 650 mg, Oral, Q6H  morphine (PF) injection 2 mg, 2 mg, Intravenous, Q2H PRN **OR** morphine sulfate (PF) injection 4 mg, 4 mg, Intravenous, Q2H PRN  sennosides-docusate sodium (SENOKOT-S) 8.6-50 MG tablet 1 tablet, 1 tablet, Oral, BID  magnesium hydroxide (MILK OF MAGNESIA) 400 MG/5ML suspension 30 mL, 30 mL, Oral, Daily PRN  oxyCODONE (ROXICODONE) immediate release tablet 5 mg, 5 mg, Oral, Q4H PRN **OR** oxyCODONE (ROXICODONE) immediate release tablet 10 mg, 10 mg, Oral, Q4H PRN  apixaban (ELIQUIS) tablet 2.5 mg, 2.5 mg, Oral, BID    Post-operatively the patients diet was advanced as tolerated and their incision was checked on POD #1. The incision is dressing in place, clean, dry and intact with no signs of infection. The patient remained neurovascularly intact in the lower extremity and had intact pulses distally. Patients calf remained soft and showed no evidence of DVT. The patient was able to move their leg and ankle/foot without any problems post-operatively.   Physical therapy and occupational therapy were consulted have been taken to prevent abuse potential and misuse of opioid medications including education, screening, and close clinical follow up.

## 2020-07-30 NOTE — PROGRESS NOTES
Inpatient Physical Therapy Daily Treatment Note    Unit: PCU  Date:  2020  Patient Name:    Keenan Mitchell  Admitting diagnosis:  H/O total hip arthroplasty, right [Z96.641]  Admit Date:  2020  Precautions/Restrictions/WB Status/ Lines/ Wounds/ Oxygen: Fall risk, Bed/chair alarm, Lines -IV and Supplemental O2 (2), Telemetry and WB Restrictions (PWB/ 50% weight bearing on the RLE)   No hip adduction beyond neutral, no external rotation and hyperextension on R hip with abductor pillow  S/P R THR (pre-op diagnosis: Avascular necrosis of the R hip) on 2020      Discharge Recommendations: Home 24 hr assist and with home PT   DME needs for discharge: RW       Therapy recommendation for EMS Transport: can transport by wheelchair    Therapy recommendations for staff:   Stand by assist with use of rolling walker (RW) for all transfers and ambulation to/from bathroom  within room    History of Present Illness: Patient is 64 yrs old female who underwent R THR due to Avascular necrosis diagnosis on the R hip on 2020. Home Health S4 Level Recommendation: Level 1 Standard  AM-PAC Mobility Score   AM-PAC Inpatient Mobility Raw Score : 20     Treatment Time: 908  Treatment number: 2  Timed Code Treatment Minutes: 60 minutes  Total Treatment Minutes:  60  minutes    Cognition    A&O x4   Able to follow 2 step commands    Subjective  Patient sitting up in chair with daughter present for the session   Pt agreeable to this PT tx.      Pain   Yes  Location: R hip   Ratin/10  Pain Medicine Status: Received pain med prior to tx     Bed Mobility   Supine to Sit:    Supervision  Sit to Supine:   Supervision  Rolling:   Not Tested  Scooting:   Supervision    Transfer Training     Sit to stand:   SBA  Stand to sit:   SBA  Bed to Chair:   SBA with use of rolling walker (RW)    Gait Training gait completed as indicated below  Distance:      40 ft + 40 ft + 15 ft  Deviations (firm surface/linoleum): decreased micha, increased JOE, decreased step length bilaterally and decreased stance time bilaterally  Assistive Device Used:    gait belt and rolling walker (RW)  Level of Assist:    SBA  Comment: Patient limited in walking distance due to fatigue. Stair Training stairs completed as indicated below  # of Steps:   6  Level of Assist:  CGA  UE Support:  unilateral on right and bilateral  Assistive Device:  RW  Pattern:   non-reciprocal pattern  Comments: LLE ascending and RLE descending due to [facility] on the RLE. Exercises Initiated  all completed bilaterally unless indicated  Ankle Pumps x 10 reps  Glut sets x 10 reps  Quad sets x 10 reps  Assisted R LE Heel slides x 10 reps    Balance  Sitting:  Normal; Independent  Comments:     Standing: Fair +; SBA  Comments: 7 min    Patient Education      Role of PT, POC, Discharge recommendations, DC recommendations, safety awareness, transfer techniques, pursed lip breathing, energy conservation, pacing activity and calling for assist with mobility. Positioning Needs       Pt in bed, alarm set, positioned in proper neutral alignment and pressure relief provided. Call light provided and all needs within reach    ROM Measurements N/A  Knee Flexion:  Knee Extension:     Activity Tolerance   Pt completed therapy session with Pain noted with activity  SOB noted with activity. SpO2 maintained above 92% throughout the session. Other  N/A    Assessment :  Patient's daughter was present throughout the session. Patient her and daughter was educated regarding the stair ambulation using R hand rail with WB precautions on the RLE + bed mobility and car transfers with R hip precautions and correct way of assisting the patient with return demonstration. Patient and her daughter both verbalized understanding about the precautions and functional mobility around steps and inside the house with RW.    Recommending Home 24 hr assist, with home PT and RW upon discharge as patient

## 2020-07-30 NOTE — PROGRESS NOTES
IM Progress Note    Admit Date:  7/29/2020  0    Interval history:  POD 1 s.p THR     Subjective:  Ms. Terri Brandt seen up in bed, feels ok today . Pain better. Worked with PT    Objective:   BP 96/64   Pulse 62   Temp 97.4 °F (36.3 °C) (Oral)   Resp 16   Ht 5' 2\" (1.575 m)   Wt 183 lb 4.8 oz (83.1 kg)   SpO2 92%   BMI 33.53 kg/m²       Intake/Output Summary (Last 24 hours) at 7/30/2020 0728  Last data filed at 7/30/2020 8933  Gross per 24 hour   Intake 3975 ml   Output 1500 ml   Net 2475 ml       Physical Exam:        General:  Awake, alert and oriented. Appears to be not in any distress  Mucous Membranes:  Pink , anicteric  Neck: No JVD, no carotid bruit, no thyromegaly  Chest:  Clear to auscultation bilaterally, no added sounds  Cardiovascular:  RRR S1S2 heard, no murmurs or gallops  Abdomen:  Soft, undistended, non tender, no organomegaly, BS present  Extremities: No edema or cyanosis. Distal pulses well felt  Neurological : grossly normal        Medications:   Scheduled Medications:    [START ON 7/31/2020] hydrochlorothiazide  12.5 mg Oral Daily    celecoxib  100 mg Oral BID    gabapentin  300 mg Oral TID    cetirizine  10 mg Oral Daily    FLUoxetine  20 mg Oral Daily    sodium chloride flush  10 mL Intravenous 2 times per day    acetaminophen  650 mg Oral Q6H    sennosides-docusate sodium  1 tablet Oral BID    apixaban  2.5 mg Oral BID     I  sodium chloride flush, morphine **OR** morphine, magnesium hydroxide, oxyCODONE **OR** oxyCODONE    Lab Data:  Recent Labs     07/30/20  0432   WBC 9.4   HGB 10.8*   HCT 33.4*   MCV 87.4        Recent Labs     07/30/20  0432      K 3.5   CL 97*   CO2 29   BUN 18   CREATININE 0.7     No results for input(s): CKTOTAL, CKMB, CKMBINDEX, TROPONINI in the last 72 hours.     Coagulation:   Lab Results   Component Value Date    INR 1.09 07/14/2020    APTT 28.0 07/14/2020     Cardiac markers: No results found for: CKMB, CKTOTAL, TROPONINI, MYOGLOBIN      No results found for: ALT, AST, GGT, ALKPHOS, BILITOT    Lab Results   Component Value Date    INR 1.09 07/14/2020    PROTIME 12.7 07/14/2020       Radiology    Chest xray       Cultures:       Assessment & Plan:       ASSESSMENT/PLAN:    Avascular necrosis of right hip  - s/p right total hip arthroplasty with Dr. Sohail Momin  - POD #1    - PT/OT; IS  - IVFs. Early ambulation , pain control to avoid drowsiness  - Eliquis 2.5 mg bid  for AC  - continue Celebrex, Gabapentin  - plans on going home at d/c     Hypertension  - BP stable. hold HCTZ today     Depression  - continue Proxac. Asthma, mild intermittent  - stable. No AE.      Obesity  - Body mass index is 34.75 kg/m².   - Recommended weight loss     DVT Prophylaxis: Eliquis  Diet: DIET GENERAL;  Code Status: Full Code     Markell Erickson MD 7/30/2020 7:28 AM

## 2020-07-30 NOTE — CARE COORDINATION
[facility]  Received referral regarding HC services from [facility]. Notified [facility] clinical team of ORTHO referral and plan for discharge home today. Spoke with pt's daughter, Sunitha Certain in follow up. Agreeable to [facility] for SN, PT/OT. Sunitha Certain states she will be with her mom to assist with care. Verified demographics. Faxed referral with orders to [facility] for [facility].     Electronically signed by Mely Morse RN on 7/30/2020 at 1:40 PM

## 2020-07-30 NOTE — CARE COORDINATION
Case Management Assessment  Initial Evaluation    Date/Time of Evaluation: 7/30/2020 10:36 AM  Assessment Completed by: Jennifer Lopez    Patient Name: Buck Gage  YOB: 1960  Diagnosis: H/O total hip arthroplasty, right [Z96.641]  Date / Time: 7/29/2020  5:58 AM  Admission status/Date:7/29/2020 obs  Chart Reviewed: Yes      Patient Interviewed: Yes   Family Interviewed:  Yes - dtr at bedside with pt permission      Hospitalization in the last 30 days:  No    Current PCP  Dr Nazanin Johnson required for SNF : Y    ADLS  Support Systems: Children  Transportation: self    Meal Preparation: self    Housing  Home Environment: lives alone in 1st floor apt  Steps: 9  Plans to Return to Present Housing: Yes  Other Identified Issues: Maddie  Currently active with [address] : No  Type of Home Care Services: None  Passport/Waiver : No  :                      Phone Number:    Passport/Waiver Services:           Durable Medical Equipment   DME Provider: none  Equipment: Walker___Cane___RTS__X_ BSC___Shower Chair___  02__ at ____Liter(s)---Uses________HHN_X__ CPAP___ BiPap___ Hospital Bed___W/C____Other_grabber_______      Has Home O2 in place on admit:  No    If above answer is No ---is pt presently on O2 during hospitalization:  No    Community Service Affiliation  Dialysis:  No    · Name:  · Location  · Dialysis Schedule:  · Phone:   · Fax: Outpatient PT/OT: No    Cancer Center: No     CHF Clinic: No     Pulmonary Rehab: No  Pain Clinic: No  Community Mental Health: No    Wound Clinic: No     COVID SCREENING: No   Other: The Plan for Transition of Care is related to the following treatment goals: home with [facility], [facility].    The Patient and/or patient representative --pt was provided with a choice of provider and agrees   with the discharge plan.  [x] Yes [] No    Freedom of choice list was provided with basic dialogue that supports the patient's individualized plan of care/goals, treatment preferences and shares the quality data associated with the providers. [x] Yes [] No    DISCHARGE PLAN: Chart reviewed, met with pt and dtr at bedside. Pt is from apt alone, dtr and Laura live next door. Dtr will provide 24/7 assistance as needed. Pt supplied with list of [facility]. agencies and would like [facility] for SN/PT/OT. Explained Case Management role/services. DISCHARGE ORDER  Date/Time 2020 10:47 AM  Completed by: Cathi Thompson, Case Management    Patient Name: Verner Spangle    : 1960  Admitting Diagnosis: H/O total hip arthroplasty, right [P18.005]    Noted discharge order. Confirmed discharge plan: Yes  with whom___pt and dtr____________  If pt confirmed DC plan does family need to be contacted by CM No  Discharge Plan: Chart reviewed, met with pt and dtr at bedside. Pt is returning home with dtr next door to provide 24/7 assistance. TC to [facility] and spoke with Warren Cardenas, she is aware pt will discharge home today. Pt supplied with RW from [employer]. Pt discharging on Eliquis 2.5mg BID, per Jesica Melgar in [address] cost per month is $0. Pt supplied with first 30 days for free. Reviewed chart. Role of discharge planner explained and patient verbalized understanding. Discharge order is noted. Has Home O2 in place on admit:  No    Pt is being d/c'd to home today. Pt's O2 sats are 95% on RA. Discharge timeout done with Flor. All discharge needs and concerns addressed.

## 2020-07-30 NOTE — OP NOTE
[address]                 [address] [address]                                OPERATIVE REPORT    PATIENT NAME: Brooke Weaver                  :        1960  MED REC NO:   [id number]                          ROOM:       [facility]  ACCOUNT NO:   [de-identified]                           ADMIT DATE: 2020  PROVIDER:     Dorie Peralta MD    DATE OF PROCEDURE:  2020    PREOPERATIVE DIAGNOSIS:  Severe osteoarthritis of the right hip  (715.35). POSTOPERATIVE DIAGNOSIS:  Severe osteoarthritis of the right hip  (715.35). OPERATIVE PROCEDURE:  Total hip replacement - right hip, Smith and  Nephew total hip replacement using 52 mm outer diameter R3 cup with two  25 mm screws, size 14 Synergy femoral stem with +0 Oxinium 36 mm head,  liner was 36 x 52 mm 20-degree offset. SURGEON:  Dorie Peralta MD    FIRST ASSISTANT:  LUZ MARIA Albright    SECOND ASSISTANT:  Mauro Reynolds SA    ESTIMATED BLOOD LOSS:  150 mL. TOURNIQUET:  Not applicable. DRAINS:  None. COMPLICATIONS:  None. POSITION:  Lateral decubitus position in contralateral hip from surgery  with the Vac-Terrence, superficial bony prominences carefully padded,  axillary roll. X-RAYS:  None. ANTIBIOTICS:  Per SCIP protocol based upon patient*s age, weight, and  allergies. SPECIAL PROCEDURES:  The patient had Cell Saver and platelet gel  utilized throughout the procedure. Platelet gel was utilized on the  uncemented placement of the components as well as on the layered  closure. DISPOSITION:  To the recovery room. INDICATIONS:  She is 64year-old. The patient*s history is well  documented in the records from [facility]. The patient has  failed nonoperative measures with respect to the osteoarthritic hip  regarding patient*s pain and functional capabilities.     There has been a lengthy discussion with the patient regarding the  risks, benefits, and potential complications of the operation as well as  a normal rehabilitative protocol. The patient specifically voiced  understanding to concerns regarding surgical infection, deep vein  thrombosis, dystrophy, arthrofibrosis, delayed rehabilitation,  dislocation of the prosthesis, periprosthetic fracture, neurologic  injury, etc.  We also talked about what would happen if the patient were  to undergo a hip dislocation and potential treatment in that regard. The patient voiced understanding to all of these concerns and elected to  have the procedure done. All questions were answered. DETAILS OF SURGERY:  The patient was seen in the preanesthesia holding  area by me, and I personally initialed the operative site. Any further  questions were also answered. The patient was then taken to the  operating room where anesthesia was induced and maintained by anesthesia  personnel. This was all documented in their records from [facility]. After the patient was positioned as described above, the hip was prepped  and draped with ChloraPrep. A longitudinal incision was made through the skin and carried down to  expose the tensor fascia steve and proximal iliotibial band. Longitudinal incision was made through these tissues. The Charnley  retractor was placed and the abductor mechanism was easily isolated. Any redundant greater trochanter bursa was removed to specifically  delineate the anatomy of the abductor mechanism. A bayonet release was  performed of the abductors, sparing the tendinous portion posteriorly. The abductor musculature was then reflected off the anterior capsule of  the hip joint. Any bleeding encountered was controlled with a Bovie. With the hip capsule identified, a capsulotomy was performed and carried  up onto the edge of the acetabulum.   A preliminary labral removal was  performed and, of course, dissection was carried along the medial side  of the femoral neck as well.    The hip was then dislocated without difficulty and the preliminary cut  was made in the femoral neck. At this point, the acetabulum was addressed. Hohmann retractors were  placed circumferentially around the acetabulum and a wing retractor was  placed proximally. The acetabular labrum was removed in its entirety. The fovea centralis was then cleaned. Any bleeding encountered was  controlled once again with the Bovie. A soft tissue release was  performed inferiorly due to the tight capsular structures. Once the acetabulum was circumferentially isolated, a serial reaming was  performed to 1-mm smaller than the aforementioned size. This gave an  excellent fit circumferentially and the trial component was placed. This was positioned in approximately 20 degrees of anteversion and 45  degrees of abduction. I was very happy with the overall bleeding bed. The platelet gel was then placed into this bleeding bed and the  acetabular component was placed. Excellent fit was obtained. The liner  was then locked into position protecting against superior and posterior  dislocation. This was all carefully checked with the tonsil. Attention was then drawn toward the femur once again. The cookie cutter  was utilized to appropriately lateralize the opening. The awls were  used and then the broach was taken of the patient up to the  aforementioned size. A trial reduction was performed and gave excellent  leg length and leg stability. The patient did not demonstrate any signs  of dislocation with  90 degrees of abduction and 50 degrees of internal and external rotation  as well as full extension and 50 degrees of internal and external  rotation. The permanent femoral component was then placed and gave the same  excellent alignment and stability. Again, platelet gel was utilized on  the prepped surface for the component placement.     Everything else was assessed once again after the hip was reduced. There were no signs of impingement. Any redundant osteophyte tissue had  been removed at this point. Overall tissue tension was appropriate. The wound was then closed in layered fashion with No. 2 Ethibond in the  capsule and abductor mechanism. Each layer was sprayed with platelet  gel. The patient*s wound was closed completely and the patient was  placed on abduction pillow postoperatively with the leg length and leg  position appearing to be appropriate. The patient was then transported  to the recovery room uneventfully.         Unique Arteaga MD    D: 07/29/2020 12:35:40       T: 07/29/2020 20:57:25     [email or web address]  Job#: [id number]     Doc#: [id number]    CC:

## 2020-07-30 NOTE — PROGRESS NOTES
Patient educated on discharge instructions as well as new medications use, dosage, administration and possible side effects. Patient verified knowledge. IV removed without difficulty and dry dressing in place. Pt left facility in stable condition to Home with all of their personal belongings.

## 2020-07-30 NOTE — DISCHARGE INSTR - COC
Continuity of Care Form    Patient Name: Sasha Zarate   :  1960  MRN:  [id number]    Admit date:  2020  Discharge date:  2020    Code Status Order: Full Code   Advance Directives:   [facility] Documentation     Date/Time Healthcare Directive Type of Healthcare Directive Copy in [address] [facility] [address] Agent's Name Healthcare Agent's Phone Number    20 1143  No, patient does not have an advance directive for healthcare treatment -- -- -- -- --    20 0632  No, patient does not have an advance directive for healthcare treatment -- -- -- -- --    20 1508  No, patient does not have an advance directive for healthcare treatment -- -- -- -- --          Admitting Physician:  Rose Judge MD  PCP: Niall Lopez [facility]    Discharging Nurse: Ania Rosas 23 Unit/Room#: /8664-50  Discharging Unit Phone Number: [phone]    Emergency Contact:   Extended Emergency Contact Information  Primary Emergency Contact: Brigida Helms   [address] Phone: [phone]  Relation: Child    Past Surgical History:  Past Surgical History:   Procedure Laterality Date     SECTION      HYSTERECTOMY      OTHER SURGICAL HISTORY  2020    right total hip replacement    HI NJX DX/THER SBST INTRLMNR CRV/THRC W/IMG GDN Bilateral 2018    BILATERAL LUMBAR FIVE TRANSFORAMINAL EPIDURAL STEROID INJECTION SITE CONFIRMED BY FLUOROSCOPY performed by Reginaldo Bird MD at [facility] DX/THER SBST INTRLMNR CRV/THRC W/IMG GDN N/A 10/16/2018    RIGHT LUMBAR THREE AND LEFT LUMBAR FIVE TRANSFORAMINAL EPIDURAL STEROID INJECTION SITE CONFIRMED BY FLUOROSCOPY performed by Reginaldo Bird MD at [facility] DX/THER SBST INTRLMNR CRV/THRC W/IMG GDN Right 10/30/2018    RIGHT LUMBAR THREE AND LEFT LUMBAR FIVE TRANSFORAMINAL EPIDURAL STEROID INJECTION SITE CONFIRMED BY FLUOROSCOPY performed by Snehal Oseguera [P.O.:1000; I.V.:2975]  Out: 600 [Urine:400; Blood:200]    Safety Concerns: At Risk for Falls    Impairments/Disabilities:      None    Nutrition Therapy:  Current Nutrition Therapy:   - Oral Diet:  General    Routes of Feeding: Oral  Liquids: No Restrictions  Daily Fluid Restriction: no  Last Modified Barium Swallow with Video (Video Swallowing Test): not done    Treatments at the Time of Hospital Discharge:   Respiratory Treatments:  Oxygen Therapy:  is not on home oxygen therapy. Ventilator:    - No ventilator support    Rehab Therapies: Physical Therapy;  Occupational Therapy  Weight Bearing Status/Restrictions: Partial weight bearing (30-50%) only on leg right leg  Other Medical Equipment (for information only, NOT a DME order):  walker  Other Treatments: No    Patient's personal belongings (please select all that are sent with patient):  None    RN SIGNATURE:  Electronically signed by Judi Perez RN on 7/30/20 at 11:37 AM EDT    CASE MANAGEMENT/SOCIAL WORK SECTION    Inpatient Status Date: 07/29/20  Readmission Risk Assessment Score:  Readmission Risk              Risk of Unplanned Readmission:        0           Discharging to Facility/ Agency   · Name: [facility]  · Address:  · Phone: [phone]  · Fax:    HOME HEALTH CARE: LEVEL 1 STANDARD    Home health agency to establish plan of care for patient over 60 day period   Nursing  Initial home SN evaluation visit to occur within 24-48 hours for:  medication management  VS and clinical assessment  S&S chronic disease exacerbation education + when to contact MD / NP  care coordination  Medication Reconciliation during 1st SN visit       PT/OT   Evaluate with goal of regaining prior level of functioning   OT to evaluate if patient has [address] needs for personal care      PCP Visit scheduled within 7 days of hospital discharge       Dialysis Facility (if applicable)   · Name:  · Address:  · Dialysis Schedule:  · Phone:  · Fax:    /Social

## 2020-07-31 ENCOUNTER — TELEPHONE (OUTPATIENT)
Dept: ORTHOPEDIC SURGERY | Age: 60
End: 2020-07-31

## 2020-07-31 NOTE — TELEPHONE ENCOUNTER
Attempted to contact pt, unable to leave a voicemail with purpose and call back number.     Mikayla Pierre  Orthopedic Nurse Navigator  Phone number: [phone]    Follow up appointments:    Future Appointments   Date Time Provider Yovanny Davidson   8/14/2020 11:30 AM LUZ MARIA Callahan AND ANIRUDH

## 2020-08-05 RX ORDER — OXYCODONE HYDROCHLORIDE AND ACETAMINOPHEN 5; 325 MG/1; MG/1
1-2 TABLET ORAL
Qty: 40 TABLET | Refills: 0 | Status: SHIPPED | OUTPATIENT
Start: 2020-08-05 | End: 2020-08-08

## 2020-08-06 ENCOUNTER — TELEPHONE (OUTPATIENT)
Dept: ORTHOPEDIC SURGERY | Age: 60
End: 2020-08-06

## 2020-08-14 ENCOUNTER — OFFICE VISIT (OUTPATIENT)
Dept: ORTHOPEDIC SURGERY | Age: 60
End: 2020-08-14

## 2020-08-14 VITALS — HEIGHT: 62 IN | WEIGHT: 183 LBS | BODY MASS INDEX: 33.68 KG/M2

## 2020-08-14 PROCEDURE — 99024 POSTOP FOLLOW-UP VISIT: CPT | Performed by: PHYSICIAN ASSISTANT

## 2020-08-14 RX ORDER — OXYCODONE HYDROCHLORIDE AND ACETAMINOPHEN 5; 325 MG/1; MG/1
1 TABLET ORAL EVERY 4 HOURS PRN
Qty: 40 TABLET | Refills: 0 | Status: SHIPPED | OUTPATIENT
Start: 2020-08-14 | End: 2020-08-25 | Stop reason: SDUPTHER

## 2020-08-14 RX ORDER — CYCLOBENZAPRINE HCL 5 MG
5 TABLET ORAL NIGHTLY PRN
Qty: 30 TABLET | Refills: 0 | Status: SHIPPED | OUTPATIENT
Start: 2020-08-14 | End: 2020-08-24

## 2020-08-14 NOTE — PROGRESS NOTES
Status post right total hip replacement: 7/29/2020    History of present illness: The patient returns today after Right total hip replacement performed on 7/29/2020. Pain control as been satisfactory with oral medications. She is sore and tired but is making progress with home therapy. Her daughter has been very active and her postoperative care. Pain Assessment  Location of Pain: Pelvis  Location Modifiers: Right  Severity of Pain: 5  Quality of Pain: Sharp, Dull, Aching  Duration of Pain: Persistent  Frequency of Pain: Intermittent  Aggravating Factors: Walking, Bending, Stretching  Limiting Behavior: Some  Relieving Factors: Rest  Result of Injury: No  Work-Related Injury: No  Are there other pain locations you wish to document?: No]     Physical examination: The incision is clean, dry, and intact with no drainage or signs of infection. There is expected swelling with no evidence of DVT and a negative Homans sign. Neurovascular exam is intact. Leg length is appropriate. Radiographs: 2 view X-rays taken today including AP pelvis and lateral views of the Right  hip show excellent alignment of the prosthesis. No evidence of septic or aseptic loosening or periprosthetic fractures. Assessment/plan: We would like to begin outpatient physical therapy to restore range of motion and strength. The patient was given local wound care instructions. We discussed antibiotic prophylaxis prior to dental procedures for 1 year postoperatively. They will followup in 3-4 weeks for reevaluation.

## 2020-08-25 RX ORDER — OXYCODONE HYDROCHLORIDE AND ACETAMINOPHEN 5; 325 MG/1; MG/1
1 TABLET ORAL EVERY 6 HOURS PRN
Qty: 28 TABLET | Refills: 0 | Status: SHIPPED | OUTPATIENT
Start: 2020-08-25 | End: 2020-09-01

## 2020-09-05 RX ORDER — HYDROCODONE BITARTRATE AND ACETAMINOPHEN 5; 325 MG/1; MG/1
1 TABLET ORAL EVERY 6 HOURS PRN
Qty: 28 TABLET | Refills: 0 | Status: SHIPPED | OUTPATIENT
Start: 2020-09-05 | End: 2020-09-11

## 2020-09-11 ENCOUNTER — OFFICE VISIT (OUTPATIENT)
Dept: ORTHOPEDIC SURGERY | Age: 60
End: 2020-09-11
Payer: MEDICAID

## 2020-09-11 VITALS — WEIGHT: 183 LBS | BODY MASS INDEX: 33.68 KG/M2 | HEIGHT: 62 IN

## 2020-09-11 PROCEDURE — 3017F COLORECTAL CA SCREEN DOC REV: CPT | Performed by: ORTHOPAEDIC SURGERY

## 2020-09-11 PROCEDURE — 1036F TOBACCO NON-USER: CPT | Performed by: ORTHOPAEDIC SURGERY

## 2020-09-11 PROCEDURE — G8417 CALC BMI ABV UP PARAM F/U: HCPCS | Performed by: ORTHOPAEDIC SURGERY

## 2020-09-11 PROCEDURE — 99024 POSTOP FOLLOW-UP VISIT: CPT | Performed by: ORTHOPAEDIC SURGERY

## 2020-09-11 PROCEDURE — 99214 OFFICE O/P EST MOD 30 MIN: CPT | Performed by: ORTHOPAEDIC SURGERY

## 2020-09-11 PROCEDURE — G8427 DOCREV CUR MEDS BY ELIG CLIN: HCPCS | Performed by: ORTHOPAEDIC SURGERY

## 2020-09-11 RX ORDER — HYDROCODONE BITARTRATE AND ACETAMINOPHEN 5; 325 MG/1; MG/1
1 TABLET ORAL EVERY 6 HOURS PRN
Qty: 28 TABLET | Refills: 0 | Status: SHIPPED | OUTPATIENT
Start: 2020-09-11 | End: 2020-09-16 | Stop reason: SDUPTHER

## 2020-09-11 NOTE — PROGRESS NOTES
CHIEF COMPLAINT:    Chief Complaint   Patient presents with    Post-Op Check     PO RT THR SX:2020       HISTORY OF PRESENT ILLNESS:                The patient is a 64 y.o. female History of present illness: The patient returns today after right total hip replacement performed on 2020. She is doing well postoperatively however, states that she is having significant pain in her left hip. She has known, severe avascular necrosis on the left hip as well. She states that she is actually using a cane on her right side due to left hip pain. The right hip is doing very well postoperatively. She has had a hard time getting physical therapy scheduled for insurance reasons but has been doing her home physical therapy exercises.     Past Medical History:   Diagnosis Date    Arthritis     Asthma     Depression     Hypertension             The pain assessment was noted & is as follows:  Pain Assessment  Location of Pain: Pelvis  Location Modifiers: Right  Severity of Pain: 4  Quality of Pain: Dull, Aching, Sharp  Duration of Pain: A few hours  Frequency of Pain: Several times daily  Aggravating Factors: Bending, Stretching, Walking  Limiting Behavior: Some  Relieving Factors: Rest  Result of Injury: No  Work-Related Injury: No  Are there other pain locations you wish to document?: No]      Work Status/Functionality:     Past Medical History: Medical history form was reviewed today & can be found in the media tab  Past Medical History:   Diagnosis Date    Arthritis     Asthma     Depression     Hypertension       Past Surgical History:     Past Surgical History:   Procedure Laterality Date     SECTION      HYSTERECTOMY      OTHER SURGICAL HISTORY  2020    right total hip replacement    [place] NJX DX/THER SBST INTRLMNR CRV/THRC W/IMG GDN Bilateral 2018    BILATERAL LUMBAR FIVE TRANSFORAMINAL EPIDURAL STEROID INJECTION SITE CONFIRMED BY FLUOROSCOPY performed by Pavan Shahid MD at delayed healing, rash, itching       PHYSICAL EXAM:    Vitals: Height 5' 2.01\" (1.575 m), weight 183 lb (83 kg), not currently breastfeeding. GENERAL EXAM:  · General Apparence: Patient is adequately groomed with no evidence of malnutrition. · Orientation: The patient is oriented to time, place and person. · Mood & Affect:The patient's mood and affect are appropriate       Left hip PHYSICAL EXAMINATION:  · Inspection: No visible deformity. No significant edema, erythema or ecchymosis. · Palpation: Palpable    Range of Motion: Range of motion is limited with internal and external rotation and is very painful. · Strength: No gross strength deficits are noted. Strength on the right is improving. · Special Tests: No pain with gentle logroll testing. Negative Homans testing. Negative straight leg raise testing. · Skin:  There are no rashes, ulcerations or lesions. · There are no dysvascular changes     Gait & station: Antalgic with a cane      Additional Examinations:       Right hip:  The incision is clean, dry, and intact with no drainage or signs of infection. There is expected swelling with no evidence of DVT and a negative Homans sign. Neurovascular exam is intact. Leg length is appropriate. Diagnostic Testin. Previous x-rays of the left hip were reviewed today and reveal severe avascular necrosis affecting greater than 50% of the femoral head    Orders   No orders of the defined types were placed in this encounter. Assessment / Treatment Plan:     1. Status post right total hip    2. Left hip avascular necrosis, severe    She is doing well following her right hip surgery. She is going to begin with outpatient physical therapy and continue working on strength and endurance. Unfortunately, her left hip is now limiting her much more than the right. We will plan to move forward with a left total hip replacement.   We will perform a left total hip replacement

## 2020-09-15 ENCOUNTER — HOSPITAL ENCOUNTER (OUTPATIENT)
Dept: PHYSICAL THERAPY | Age: 60
Setting detail: THERAPIES SERIES
Discharge: HOME | End: 2020-09-15
Payer: MEDICAID

## 2020-09-15 PROCEDURE — 97112 NEUROMUSCULAR REEDUCATION: CPT

## 2020-09-15 PROCEDURE — 97110 THERAPEUTIC EXERCISES: CPT

## 2020-09-15 PROCEDURE — 97161 PT EVAL LOW COMPLEX 20 MIN: CPT

## 2020-09-15 NOTE — FLOWSHEET NOTE
[facility], [address] [place] [address] [address]  Phone: [phone]   Fax:     [phone]      Physical Therapy Treatment Note/ Progress Report:           Date:  9/15/2020    Patient Name:  Keenan Mitchell    :  1960  MRN: [id number]  Restrictions/Precautions:    Medical/Treatment Diagnosis Information:  · Diagnosis: Z34.341 (ICD-10-CM) - Status post total hip replacement, right  · Treatment Diagnosis: Right hip pain  Insurance/Certification information:  PT Insurance Information: [facility],409- approved for eval  Physician Information:  Referring Practitioner: LUZ MARIA Ly  Has the plan of care been signed (Y/N):        []  Yes  []  No     Date of Patient follow up with Physician: None    Assessment Summary: Flaquita Miller is a 64 y.o. female reporting to OP PT s/p right BAIRON on 20. Pt is noted to doing rather well. She continues to have mild strength deficits and discomfort with ROM.  Pt also have left hip issues which also continue to limit function      Is this a Progress Report:     []  Yes  [x]  No        If Yes:  Date Range for reporting period:  Beginning 9/15/20  Ending     Progress report will be due (10 Rx or 30 days whichever is less):        Recertification will be due (POC Duration  / 90 days whichever is less): 10/15/20         Visit # Insurance Allowable Auth Required   1 Needs auth [x]  Yes []  No        Functional Scale: LEFS 66%    Date assessed:        Latex Allergy:  [x]NO      []YES  Preferred Language for Healthcare:   [x]English       []other:      Pain level:  2-5/10     SUBJECTIVE:  see eval    OBJECTIVE: see eval      RESTRICTIONS/PRECAUTIONS: Anterolateral hip precautions    Exercises/Interventions:   Therapeutic Ex (21191) HEP 9/15/20     Warm-up       Rec bike              TABLE       HL adduction isometric x X20, 3\"     HL CS x X20, GTB     Bridge x 10x2     Heel slides x10            SEATED       LAQ  x20     HS stretch x 30\"x2                          STANDING       Hip extension x x15 B     Lateral walks x x10 along table     Tandem standing x 30\"x2 ea                                                                                           Manual (08527): PROM to hip into flexion and abduction    Therapeutic Exercise and NMR EXR  [x] (54274) Provided verbal/tactile cueing for activities related to strengthening, flexibility, endurance, ROM for improvements in LE, proximal hip, and core control with self care, mobility, lifting, ambulation. [x] (18227) Provided verbal/tactile cueing for activities related to improving balance, coordination, kinesthetic sense, posture, motor skill, proprioception to assist with LE, proximal hip, and core control in self-care, mobility, lifting, ambulation and eccentric single leg control.      NMR and Therapeutic Activities:    [x] (95526 or 65535) Provided verbal/tactile cueing for activities related to improving balance, coordination, kinesthetic sense, posture, motor skill, proprioception and motor activation to allow for proper function of core, proximal hip and LE with self-care and ADLs and functional mobility.   [] (89498) Gait Re-education- Provided training and instruction to the patient for proper LE, core and proximal hip recruitment and positioning and eccentric body weight control with ambulation re-education including up and down stairs     Home Exercise Program:    [x] (93328) Reviewed/Progressed HEP activities related to strengthening, flexibility, endurance, ROM of core, proximal hip and LE for functional self-care, mobility, lifting and ambulation/stair navigation   [] (25188) Reviewed/Progressed HEP activities related to improving balance, coordination, kinesthetic sense, posture, motor skill, proprioception of core, proximal hip and LE for self-care, mobility, lifting, and ambulation/stair navigation      Manual Treatments: PROM / STM / Oscillations-Mobs:  G-I, II, III, IV (PA's, Inf., Post.)  [] (33225) Provided manual therapy to mobilize LE, proximal hip and/or LS spine soft tissue/joints for the purpose of modulating pain, promoting relaxation, increasing ROM, reducing/eliminating soft tissue swelling/inflammation/restriction, improving soft tissue extensibility and allowing for proper ROM for normal function with self-care, mobility, lifting and ambulation. Modalities:     [] GAME READY (VASO)- for significant edema, swelling, pain control. Charges:  Timed Code Treatment Minutes: 25   Total Treatment Minutes: 40      [x] EVAL (LOW) 00665 (typically 20 minutes face-to-face)  [] EVAL (MOD) 34747 (typically 30 minutes face-to-face)  [] EVAL (HIGH) 21082 (typically 45 minutes face-to-face)  [] RE-EVAL     [x] NT(10026) x     [] IONTO  [x] NMR (39352) x     [] VASO  [] Manual (14535) x     [] Other:  [] TA x      [] Mech Traction (55175)  [] ES(attended) (74629)      [] ES (un) (47409):    GOALS:      Patient stated goal: Walk without pain    Therapist goals for Patient:   Short Term Goals: To be achieved in: 2 weeks  1. Independent in HEP and progression per patient tolerance, in order to prevent re-injury. [] Progressing: [] Met: [] Not Met: [] Adjusted     2. Patient will have a decrease in pain to facilitate improvement in movement, function, and ADLs as indicated by Functional Deficits. [] Progressing: [] Met: [] Not Met: [] Adjusted     Long Term Goals: To be achieved in: 4 weeks  1. Disability index score of 46% or less for the LEFS to assist with reaching prior level of function. [] Progressing: [] Met: [] Not Met: [] Adjusted     2. Patient will demonstrate increased AROM flexion to 110 to allow for proper joint functioning as indicated by patients Functional Deficits. [] Progressing: [] Met: [] Not Met: [] Adjusted     3.  Patient will demonstrate an increase in Strength to 5/5  proximal hip strength and control to allow for proper functional mobility as indicated by patients Functional Deficits. [] Progressing: [] Met: [] Not Met: [] Adjusted      4. Patient will return to functional activities without increased symptoms or restriction. [] Progressing: [] Met: [] Not Met: [] Adjusted     5. Pt will be able to ambulate stairs with less limitation. (patient specific functional goal)    [] Progressing: [] Met: [] Not Met: [] Adjusted       ASSESSMENT:  See eval    Patient received education on their current pathology and how their condition effects them with their functional activities. Patient understood discussion and questions were answered. Patient understands their activity limitations and understands rational for treatment progression. Pt educated on plan of care and HEP, if worsening symptoms to d/c that exercise. Overall Progression Towards Functional goals/ Treatment Progress Update:  [] Patient is progressing as expected towards functional goals listed. [] Progression is slowed due to complexities/Impairments listed. [] Progression has been slowed due to co-morbidities.   [x] Plan just implemented, too soon to assess goals progression <30days   [] Goals require adjustment due to lack of progress  [] Patient is not progressing as expected and requires additional follow up with physician  [] Other    Prognosis for POC: [x] Good [] Fair  [] Poor      Patient requires continued skilled intervention: [x] Yes  [] No    Treatment/Activity Tolerance:  [x] Patient able to complete treatment  [] Patient limited by fatigue  [] Patient limited by pain    [] Patient limited by other medical complications  [] Other:           PLAN: See eval  [] Continue per plan of care [] Alter current plan (see comments above)  [x] Plan of care initiated [] Hold pending MD visit [] Discharge      Electronically signed by:  Karen Arceo PT    Note: If patient does not return for scheduled/ recommended follow up visits, this note will serve as a discharge from care along with most recent update on progress.

## 2020-09-15 NOTE — PLAN OF CARE
work   [x]Reduced ability to perform lifting, carrying tasks   [x]Reduced ability to squat   [x]Reduced ability to forward bend   [x]Reduced ability to ambulate prolonged functional periods/distances/surfaces   [x]Reduced ability to ascend/descend stairs   []Reduced ability to run, hop, cut or jump   []other:    Participation Restrictions   []Reduced participation in self care activities   [x]Reduced participation in home management activities   []Reduced participation in work activities   []Reduced participation in social activities. []Reduced participation in sport/recreation activities. Classification :    [x]Signs/symptoms consistent with post-surgical status including decreased ROM, strength and function.    []Signs/symptoms consistent with joint sprain/strain   []Signs/symptoms consistent with patella-femoral syndrome   [x]Signs/symptoms consistent with hip OA   []Signs/symptoms consistent with internal derangement of knee/Hip   []Signs/symptoms consistent with functional hip weakness/NMR control      []Signs/symptoms consistent with tendinitis/tendinosis    []signs/symptoms consistent with pathology which may benefit from Dry needling      []other:      Prognosis/Rehab Potential:      []Excellent   [x]Good    []Fair   []Poor    Tolerance of evaluation/treatment:    []Excellent   [x]Good    []Fair   []Poor    Physical Therapy Evaluation Complexity Justification  [x] A history of present problem with:  [] no personal factors and/or comorbidities that impact the plan of care;  [x]1-2 personal factors and/or comorbidities that impact the plan of care  []3 personal factors and/or comorbidities that impact the plan of care  [x] An examination of body systems using standardized tests and measures addressing any of the following: body structures and functions (impairments), activity limitations, and/or participation restrictions;:  [x] a total of 1-2 or more elements   [] a total of 3 or more elements   [] a total of 4 or more elements   [x] A clinical presentation with:  [x] stable and/or uncomplicated characteristics   [] evolving clinical presentation with changing characteristics  [] unstable and unpredictable characteristics;   [x] Clinical decision making of [] low, [] moderate, [] high complexity using standardized patient assessment instrument and/or measurable assessment of functional outcome. [x] EVAL (LOW) 74955 (typically 20 minutes face-to-face)  [] EVAL (MOD) 05870 (typically 30 minutes face-to-face)  [] EVAL (HIGH) 53272 (typically 45 minutes face-to-face)  [] RE-EVAL     PLAN:   Frequency/Duration:  2 days per week for 4 Weeks:  Interventions:  [x]  Therapeutic exercise including: strength training, ROM, for Lower extremity and core   [x]  NMR activation and proprioception for LE, Glutes and Core   [x]  Manual therapy as indicated for LE, Hip and spine to include: Dry Needling/IASTM, STM, PROM, Gr I-IV mobilizations. [x] Modalities as needed that may include: thermal agents, E-stim, Biofeedback, US, iontophoresis as indicated  [x] Patient education on joint protection, postural re-education, activity modification, progression of HEP. HEP instruction: (see scanned forms)    GOALS:  Patient stated goal: Walk without pain    Therapist goals for Patient:   Short Term Goals: To be achieved in: 2 weeks  1. Independent in HEP and progression per patient tolerance, in order to prevent re-injury. [] Progressing: [] Met: [] Not Met: [] Adjusted     2. Patient will have a decrease in pain to facilitate improvement in movement, function, and ADLs as indicated by Functional Deficits. [] Progressing: [] Met: [] Not Met: [] Adjusted     Long Term Goals: To be achieved in: 4 weeks  1. Disability index score of 46% or less for the LEFS to assist with reaching prior level of function. [] Progressing: [] Met: [] Not Met: [] Adjusted     2.  Patient will demonstrate increased AROM flexion to 110 to allow for proper joint functioning as indicated by patients Functional Deficits. [] Progressing: [] Met: [] Not Met: [] Adjusted     3. Patient will demonstrate an increase in Strength to 5/5  proximal hip strength and control to allow for proper functional mobility as indicated by patients Functional Deficits. [] Progressing: [] Met: [] Not Met: [] Adjusted      4. Patient will return to functional activities without increased symptoms or restriction. [] Progressing: [] Met: [] Not Met: [] Adjusted     5.  Pt will be able to ambulate stairs with less limitation. (patient specific functional goal)    [] Progressing: [] Met: [] Not Met: [] Adjusted      Electronically signed by:  Basilia Perze PT

## 2020-09-16 RX ORDER — HYDROCODONE BITARTRATE AND ACETAMINOPHEN 5; 325 MG/1; MG/1
1 TABLET ORAL EVERY 8 HOURS PRN
Qty: 21 TABLET | Refills: 0 | Status: SHIPPED | OUTPATIENT
Start: 2020-09-16 | End: 2020-09-23 | Stop reason: SDUPTHER

## 2020-09-23 RX ORDER — HYDROCODONE BITARTRATE AND ACETAMINOPHEN 5; 325 MG/1; MG/1
1 TABLET ORAL EVERY 12 HOURS
Qty: 14 TABLET | Refills: 0 | Status: SHIPPED | OUTPATIENT
Start: 2020-09-23 | End: 2020-10-01 | Stop reason: SDUPTHER

## 2020-10-01 RX ORDER — HYDROCODONE BITARTRATE AND ACETAMINOPHEN 5; 325 MG/1; MG/1
1 TABLET ORAL EVERY 12 HOURS
Qty: 14 TABLET | Refills: 0 | Status: SHIPPED | OUTPATIENT
Start: 2020-10-01 | End: 2020-10-08 | Stop reason: SDUPTHER

## 2020-10-08 RX ORDER — HYDROCODONE BITARTRATE AND ACETAMINOPHEN 5; 325 MG/1; MG/1
1 TABLET ORAL EVERY 12 HOURS
Qty: 14 TABLET | Refills: 0 | Status: SHIPPED | OUTPATIENT
Start: 2020-10-08 | End: 2020-10-16 | Stop reason: SDUPTHER

## 2020-10-13 ENCOUNTER — TELEPHONE (OUTPATIENT)
Dept: ORTHOPEDIC SURGERY | Age: 60
End: 2020-10-13

## 2020-10-13 NOTE — TELEPHONE ENCOUNTER
COVID:11/10/2020- Negative     Orthopedic Nurse Navigator Summary  -  Patient Name: Octavoi Hankins  Anticipated Date of Surgery:11/16/2020  Using [employer]? Yes, Are they Registered: Yes  Attended Pre-Op Education Class: No  If No, why not? PCP:  Phone #:  Date of PCP Visit for H&P: 11/12/2020  Any Noted Concerns from PCP prior to surgery:  If Yes, what concerns?:  Is the Patient in a Pain Management Program?: No  Review of Past Medical History Reveals History of:  -  Critical Lab Values:  - Hemoglobin (g/dL): Date Value 13.3  - Hematocrit (%): Date Value  - HgbA1C : Date Value 6.0  - Albumin : Date Value 3.8  - BUN (mg/dL) : Date Value 17.0  - CREATININE (mg/dL) : Date Value 0.7  - BMI (kg/m2) : Date Value  -  Coronary Artery Disease/HTN/CHF History: Yes  Cardiologist: HTN managed by PCP and meds  Cardiac Clearance Necessary: No  Date of Cardiac Clearance Appt: On Plavix? No  If YES, when will they stop taking? Final Cardiac Recommendations: On any anticoagulation: No  -  Diabetes History: No  Most Recent HgbA1C: 6.0  PCP or Endocrine Recommendations:  Nutritionist/Dietician Consult Scheduled:  Final Plan For Diabetic Control:  Pulmonary: COPD/Emphysema/ Use of home oxygen: NONE- asthma  Alcohol use: Non-Drinker  -  DVT Risk Stratification: Low Risk  Vascular Consult Ordered:  Date of Vascular Appt:  Hematology/Oncology Consult Ordered:  Date of Hematology/Oncology Appt:  Final Recommendation For DVT Prophylaxis:  Smoking history: Former Smoker (> 6 weeks)  Use of Estrogen:  -  BMI Greater than 40 at time of scheduling?: No  Has Surgeon been notified of BMI concern? Yes  Weight Loss Clinic Consult Ordered No  Date of Wt Loss Clinic Appt:  BMI at time of surgery (if went through [facility]):  -  Additional Medical Concerns:   Additional Recommendations for above concerns:  Attended Pre-Hab Program: No  Anticipated Discharge Disposition: D/C home- ok with same day discharge  Who will be with patient at home following discharge? daughter  Equipment patient already has: walker- standard  Bedroom on first or second floor: first  Bathroom on first or second floor: first  Weight bearing status: full  Pre-op ambulatory status:  Number of entry steps: TEN  Caregiver assistance: full time  [facility]  10/30/2020

## 2020-10-16 RX ORDER — HYDROCODONE BITARTRATE AND ACETAMINOPHEN 5; 325 MG/1; MG/1
1 TABLET ORAL EVERY 12 HOURS
Qty: 14 TABLET | Refills: 0 | Status: SHIPPED | OUTPATIENT
Start: 2020-10-16 | End: 2020-10-23 | Stop reason: SDUPTHER

## 2020-10-23 RX ORDER — HYDROCODONE BITARTRATE AND ACETAMINOPHEN 5; 325 MG/1; MG/1
1 TABLET ORAL EVERY 12 HOURS
Qty: 14 TABLET | Refills: 0 | Status: SHIPPED | OUTPATIENT
Start: 2020-10-23 | End: 2020-10-30 | Stop reason: SDUPTHER

## 2020-10-23 RX ORDER — HYDROCODONE BITARTRATE AND ACETAMINOPHEN 5; 325 MG/1; MG/1
1 TABLET ORAL EVERY 12 HOURS
Qty: 14 TABLET | Refills: 0 | Status: CANCELLED | OUTPATIENT
Start: 2020-10-23 | End: 2020-10-30

## 2020-10-29 ENCOUNTER — TELEPHONE (OUTPATIENT)
Dept: ORTHOPEDIC SURGERY | Age: 60
End: 2020-10-29

## 2020-10-30 ENCOUNTER — TELEPHONE (OUTPATIENT)
Dept: ORTHOPEDIC SURGERY | Age: 60
End: 2020-10-30

## 2020-10-30 RX ORDER — HYDROCODONE BITARTRATE AND ACETAMINOPHEN 5; 325 MG/1; MG/1
1 TABLET ORAL EVERY 12 HOURS
Qty: 14 TABLET | Refills: 0 | Status: SHIPPED | OUTPATIENT
Start: 2020-10-30 | End: 2020-11-05 | Stop reason: SDUPTHER

## 2020-11-02 ENCOUNTER — HOSPITAL ENCOUNTER (OUTPATIENT)
Dept: PREADMISSION TESTING | Age: 60
Discharge: HOME | End: 2020-11-06
Payer: MEDICAID

## 2020-11-02 LAB
ABO/RH: NORMAL
ALBUMIN SERPL-MCNC: 4.4 G/DL (ref 3.4–5)
ANION GAP SERPL CALCULATED.3IONS-SCNC: 17 MMOL/L (ref 3–16)
ANTIBODY SCREEN: NORMAL
APTT: 24.8 SEC (ref 24.2–36.2)
BILIRUBIN URINE: NEGATIVE
BLOOD, URINE: NEGATIVE
BUN BLDV-MCNC: 18 MG/DL (ref 7–20)
CALCIUM SERPL-MCNC: 9.8 MG/DL (ref 8.3–10.6)
CHLORIDE BLD-SCNC: 93 MMOL/L (ref 99–110)
CLARITY: CLEAR
CO2: 27 MMOL/L (ref 21–32)
COLOR: YELLOW
CREAT SERPL-MCNC: 0.8 MG/DL (ref 0.6–1.1)
EKG ATRIAL RATE: 64 BPM
EKG DIAGNOSIS: NORMAL
EKG P AXIS: 67 DEGREES
EKG P-R INTERVAL: 142 MS
EKG Q-T INTERVAL: 456 MS
EKG QRS DURATION: 86 MS
EKG QTC CALCULATION (BAZETT): 470 MS
EKG R AXIS: 42 DEGREES
EKG T AXIS: 63 DEGREES
EKG VENTRICULAR RATE: 64 BPM
GFR AFRICAN AMERICAN: >60
GFR NON-AFRICAN AMERICAN: >60
GLUCOSE BLD-MCNC: 116 MG/DL (ref 70–99)
GLUCOSE URINE: NEGATIVE MG/DL
INR BLD: 0.99 (ref 0.86–1.14)
KETONES, URINE: NEGATIVE MG/DL
LEUKOCYTE ESTERASE, URINE: NEGATIVE
MICROSCOPIC EXAMINATION: NORMAL
NITRITE, URINE: NEGATIVE
PH UA: 7 (ref 5–8)
POTASSIUM SERPL-SCNC: 3.3 MMOL/L (ref 3.5–5.1)
PROTEIN UA: NEGATIVE MG/DL
PROTHROMBIN TIME: 11.5 SEC (ref 10–13.2)
SODIUM BLD-SCNC: 137 MMOL/L (ref 136–145)
SPECIFIC GRAVITY UA: 1.02 (ref 1–1.03)
TRANSFERRIN: 289 MG/DL (ref 200–360)
URINE TYPE: NORMAL
UROBILINOGEN, URINE: 0.2 E.U./DL

## 2020-11-02 PROCEDURE — 84466 ASSAY OF TRANSFERRIN: CPT

## 2020-11-02 PROCEDURE — 36415 COLL VENOUS BLD VENIPUNCTURE: CPT

## 2020-11-02 PROCEDURE — 93005 ELECTROCARDIOGRAM TRACING: CPT

## 2020-11-02 PROCEDURE — 87086 URINE CULTURE/COLONY COUNT: CPT

## 2020-11-02 PROCEDURE — 87088 URINE BACTERIA CULTURE: CPT

## 2020-11-02 PROCEDURE — 81003 URINALYSIS AUTO W/O SCOPE: CPT

## 2020-11-02 PROCEDURE — 86900 BLOOD TYPING SEROLOGIC ABO: CPT

## 2020-11-02 PROCEDURE — 85610 PROTHROMBIN TIME: CPT

## 2020-11-02 PROCEDURE — 80048 BASIC METABOLIC PNL TOTAL CA: CPT

## 2020-11-02 PROCEDURE — 85730 THROMBOPLASTIN TIME PARTIAL: CPT

## 2020-11-02 PROCEDURE — 86850 RBC ANTIBODY SCREEN: CPT

## 2020-11-02 PROCEDURE — 87186 SC STD MICRODIL/AGAR DIL: CPT

## 2020-11-02 PROCEDURE — 82040 ASSAY OF SERUM ALBUMIN: CPT

## 2020-11-02 PROCEDURE — 86901 BLOOD TYPING SEROLOGIC RH(D): CPT

## 2020-11-02 PROCEDURE — 83036 HEMOGLOBIN GLYCOSYLATED A1C: CPT

## 2020-11-02 PROCEDURE — 87077 CULTURE AEROBIC IDENTIFY: CPT

## 2020-11-02 PROCEDURE — 85025 COMPLETE CBC W/AUTO DIFF WBC: CPT

## 2020-11-03 LAB
ANISOCYTOSIS: ABNORMAL
BASOPHILS ABSOLUTE: 0.1 K/UL (ref 0–0.2)
BASOPHILS RELATIVE PERCENT: 0.6 %
EOSINOPHILS ABSOLUTE: 0.3 K/UL (ref 0–0.6)
EOSINOPHILS RELATIVE PERCENT: 3 %
ESTIMATED AVERAGE GLUCOSE: 125.5 MG/DL
HBA1C MFR BLD: 6 %
HCT VFR BLD CALC: 43.7 % (ref 36–48)
HEMOGLOBIN: 14 G/DL (ref 12–16)
LYMPHOCYTES ABSOLUTE: 3.5 K/UL (ref 1–5.1)
LYMPHOCYTES RELATIVE PERCENT: 37.2 %
MCH RBC QN AUTO: 27.1 PG (ref 26–34)
MCHC RBC AUTO-ENTMCNC: 32.1 G/DL (ref 31–36)
MCV RBC AUTO: 84.5 FL (ref 80–100)
MONOCYTES ABSOLUTE: 0.5 K/UL (ref 0–1.3)
MONOCYTES RELATIVE PERCENT: 4.9 %
NEUTROPHILS ABSOLUTE: 5.1 K/UL (ref 1.7–7.7)
NEUTROPHILS RELATIVE PERCENT: 54.3 %
PDW BLD-RTO: 18 % (ref 12.4–15.4)
PLATELET # BLD: 341 K/UL (ref 135–450)
PLATELET SLIDE REVIEW: ADEQUATE
PMV BLD AUTO: 8.4 FL (ref 5–10.5)
RBC # BLD: 5.18 M/UL (ref 4–5.2)
SLIDE REVIEW: ABNORMAL
WBC # BLD: 9.4 K/UL (ref 4–11)

## 2020-11-04 LAB
ORGANISM: ABNORMAL
URINE CULTURE, ROUTINE: ABNORMAL
URINE CULTURE, ROUTINE: ABNORMAL

## 2020-11-05 RX ORDER — HYDROCODONE BITARTRATE AND ACETAMINOPHEN 5; 325 MG/1; MG/1
1 TABLET ORAL EVERY 12 HOURS
Qty: 14 TABLET | Refills: 0 | Status: SHIPPED | OUTPATIENT
Start: 2020-11-05 | End: 2020-11-12 | Stop reason: SDUPTHER

## 2020-11-05 NOTE — TELEPHONE ENCOUNTER
Controlled Substance Monitoring:    Acute and Chronic Pain Monitoring:   RX Monitoring 11/5/2020   Acute Pain Prescriptions Severe pain not adequately treated with lower dose. Periodic Controlled Substance Monitoring No signs of potential drug abuse or diversion identified.

## 2020-11-06 ENCOUNTER — ANESTHESIA EVENT (OUTPATIENT)
Dept: OPERATING ROOM | Age: 60
DRG: 324 | End: 2020-11-06
Payer: MEDICAID

## 2020-11-06 RX ORDER — ATORVASTATIN CALCIUM 20 MG/1
20 TABLET, FILM COATED ORAL NIGHTLY
COMMUNITY

## 2020-11-06 NOTE — PROGRESS NOTES
Obstructive Sleep Apnea (TONNY) Screening     Patient:  Jodee Closs    YOB: 1960      Medical Record #:  [id number]                     Date:  11/6/2020     1. Are you a loud and/or regular snorer? [x]  Yes       [] No    2. Have you been observed to gasp or stop breathing during sleep? []  Yes       [x] No    3. Do you feel tired or groggy upon awakening or do you awaken with a headache?           []  Yes       [x] No    4. Are you often tired or fatigued during the wake time hours? []  Yes       [x] No    5. Do you fall asleep sitting, reading, watching TV or driving? []  Yes       [x] No    6. Do you often have problems with memory or concentration? []  Yes       [x] No    **If patient's score is ? 3 they are considered high risk for TONNY. An Anesthesia provider will evaluate the patient and develop a plan of care the day of surgery. Note:  If the patient's BMI is more than 35 kg m¯² , has neck circumference > 40 cm, and/or high blood pressure the risk is greater (© American [employer]nea Association, 2006).

## 2020-11-10 ENCOUNTER — OFFICE VISIT (OUTPATIENT)
Dept: PRIMARY CARE CLINIC | Age: 60
End: 2020-11-10
Payer: MEDICAID

## 2020-11-10 PROCEDURE — 99211 OFF/OP EST MAY X REQ PHY/QHP: CPT | Performed by: NURSE PRACTITIONER

## 2020-11-10 NOTE — PATIENT INSTRUCTIONS

## 2020-11-10 NOTE — PROGRESS NOTES
Kaitlynn Tse received a viral test for COVID-19. They were educated on isolation and quarantine as appropriate. For any symptoms, they were directed to seek care from their PCP, given contact information to establish with a doctor, directed to an urgent care or the emergency room.

## 2020-11-12 RX ORDER — HYDROCODONE BITARTRATE AND ACETAMINOPHEN 5; 325 MG/1; MG/1
1 TABLET ORAL EVERY 12 HOURS
Qty: 14 TABLET | Refills: 0 | Status: ON HOLD
Start: 2020-11-12 | End: 2020-11-17 | Stop reason: HOSPADM

## 2020-11-13 LAB — SARS-COV-2: NOT DETECTED

## 2020-11-16 ENCOUNTER — APPOINTMENT (OUTPATIENT)
Dept: GENERAL RADIOLOGY | Age: 60
DRG: 324 | End: 2020-11-16
Attending: ORTHOPAEDIC SURGERY
Payer: MEDICAID

## 2020-11-16 ENCOUNTER — HOSPITAL ENCOUNTER (INPATIENT)
Age: 60
LOS: 1 days | Discharge: HOME HEALTH | DRG: 324 | End: 2020-11-17
Attending: ORTHOPAEDIC SURGERY | Admitting: ORTHOPAEDIC SURGERY
Payer: MEDICAID

## 2020-11-16 ENCOUNTER — ANESTHESIA (OUTPATIENT)
Dept: OPERATING ROOM | Age: 60
DRG: 324 | End: 2020-11-16
Payer: MEDICAID

## 2020-11-16 VITALS
DIASTOLIC BLOOD PRESSURE: 65 MMHG | RESPIRATION RATE: 1 BRPM | OXYGEN SATURATION: 100 % | SYSTOLIC BLOOD PRESSURE: 134 MMHG

## 2020-11-16 DIAGNOSIS — M16.12 PRIMARY OSTEOARTHRITIS OF LEFT HIP: ICD-10-CM

## 2020-11-16 DIAGNOSIS — Z96.642 STATUS POST TOTAL HIP REPLACEMENT, LEFT: Primary | ICD-10-CM

## 2020-11-16 LAB
ABO/RH: NORMAL
ANION GAP SERPL CALCULATED.3IONS-SCNC: 11 MMOL/L (ref 3–16)
ANTIBODY SCREEN: NORMAL
BUN BLDV-MCNC: 17 MG/DL (ref 7–20)
CALCIUM SERPL-MCNC: 9.9 MG/DL (ref 8.3–10.6)
CHLORIDE BLD-SCNC: 96 MMOL/L (ref 99–110)
CO2: 29 MMOL/L (ref 21–32)
CREAT SERPL-MCNC: 0.7 MG/DL (ref 0.6–1.1)
GFR AFRICAN AMERICAN: >60
GFR NON-AFRICAN AMERICAN: >60
GLUCOSE BLD-MCNC: 120 MG/DL (ref 70–99)
MAGNESIUM: 2.2 MG/DL (ref 1.8–2.4)
POTASSIUM REFLEX MAGNESIUM: 3.4 MMOL/L (ref 3.5–5.1)
SODIUM BLD-SCNC: 136 MMOL/L (ref 136–145)

## 2020-11-16 PROCEDURE — 7100000001 HC PACU RECOVERY - ADDTL 15 MIN: Performed by: ORTHOPAEDIC SURGERY

## 2020-11-16 PROCEDURE — 80048 BASIC METABOLIC PNL TOTAL CA: CPT

## 2020-11-16 PROCEDURE — C1713 ANCHOR/SCREW BN/BN,TIS/BN: HCPCS | Performed by: ORTHOPAEDIC SURGERY

## 2020-11-16 PROCEDURE — 2500000003 HC RX 250 WO HCPCS: Performed by: ORTHOPAEDIC SURGERY

## 2020-11-16 PROCEDURE — 6360000002 HC RX W HCPCS: Performed by: PHYSICIAN ASSISTANT

## 2020-11-16 PROCEDURE — G0378 HOSPITAL OBSERVATION PER HR: HCPCS

## 2020-11-16 PROCEDURE — 97530 THERAPEUTIC ACTIVITIES: CPT

## 2020-11-16 PROCEDURE — 6360000002 HC RX W HCPCS: Performed by: ORTHOPAEDIC SURGERY

## 2020-11-16 PROCEDURE — 97161 PT EVAL LOW COMPLEX 20 MIN: CPT

## 2020-11-16 PROCEDURE — 1200000000 HC SEMI PRIVATE

## 2020-11-16 PROCEDURE — 86900 BLOOD TYPING SEROLOGIC ABO: CPT

## 2020-11-16 PROCEDURE — 6360000002 HC RX W HCPCS: Performed by: NURSE ANESTHETIST, CERTIFIED REGISTERED

## 2020-11-16 PROCEDURE — 2720000010 HC SURG SUPPLY STERILE: Performed by: ORTHOPAEDIC SURGERY

## 2020-11-16 PROCEDURE — 2700000000 HC OXYGEN THERAPY PER DAY

## 2020-11-16 PROCEDURE — 6360000002 HC RX W HCPCS: Performed by: ANESTHESIOLOGY

## 2020-11-16 PROCEDURE — 88304 TISSUE EXAM BY PATHOLOGIST: CPT

## 2020-11-16 PROCEDURE — 88311 DECALCIFY TISSUE: CPT

## 2020-11-16 PROCEDURE — 0SRB06A REPLACEMENT OF LEFT HIP JOINT WITH OXIDIZED ZIRCONIUM ON POLYETHYLENE SYNTHETIC SUBSTITUTE, UNCEMENTED, OPEN APPROACH: ICD-10-PCS | Performed by: ORTHOPAEDIC SURGERY

## 2020-11-16 PROCEDURE — 94761 N-INVAS EAR/PLS OXIMETRY MLT: CPT

## 2020-11-16 PROCEDURE — 97165 OT EVAL LOW COMPLEX 30 MIN: CPT

## 2020-11-16 PROCEDURE — 97535 SELF CARE MNGMENT TRAINING: CPT

## 2020-11-16 PROCEDURE — 86901 BLOOD TYPING SEROLOGIC RH(D): CPT

## 2020-11-16 PROCEDURE — 97110 THERAPEUTIC EXERCISES: CPT

## 2020-11-16 PROCEDURE — 86850 RBC ANTIBODY SCREEN: CPT

## 2020-11-16 PROCEDURE — 3700000001 HC ADD 15 MINUTES (ANESTHESIA): Performed by: ORTHOPAEDIC SURGERY

## 2020-11-16 PROCEDURE — 83735 ASSAY OF MAGNESIUM: CPT

## 2020-11-16 PROCEDURE — 2500000003 HC RX 250 WO HCPCS

## 2020-11-16 PROCEDURE — 97116 GAIT TRAINING THERAPY: CPT

## 2020-11-16 PROCEDURE — 3700000000 HC ANESTHESIA ATTENDED CARE: Performed by: ORTHOPAEDIC SURGERY

## 2020-11-16 PROCEDURE — 2580000003 HC RX 258: Performed by: PHYSICIAN ASSISTANT

## 2020-11-16 PROCEDURE — C1776 JOINT DEVICE (IMPLANTABLE): HCPCS | Performed by: ORTHOPAEDIC SURGERY

## 2020-11-16 PROCEDURE — 2580000003 HC RX 258: Performed by: ORTHOPAEDIC SURGERY

## 2020-11-16 PROCEDURE — 7100000000 HC PACU RECOVERY - FIRST 15 MIN: Performed by: ORTHOPAEDIC SURGERY

## 2020-11-16 PROCEDURE — C9290 INJ, BUPIVACAINE LIPOSOME: HCPCS | Performed by: ORTHOPAEDIC SURGERY

## 2020-11-16 PROCEDURE — 73501 X-RAY EXAM HIP UNI 1 VIEW: CPT

## 2020-11-16 PROCEDURE — 2709999900 HC NON-CHARGEABLE SUPPLY: Performed by: ORTHOPAEDIC SURGERY

## 2020-11-16 PROCEDURE — 6370000000 HC RX 637 (ALT 250 FOR IP): Performed by: ANESTHESIOLOGY

## 2020-11-16 PROCEDURE — 6370000000 HC RX 637 (ALT 250 FOR IP): Performed by: PHYSICIAN ASSISTANT

## 2020-11-16 PROCEDURE — 3600000005 HC SURGERY LEVEL 5 BASE: Performed by: ORTHOPAEDIC SURGERY

## 2020-11-16 PROCEDURE — 2500000003 HC RX 250 WO HCPCS: Performed by: NURSE ANESTHETIST, CERTIFIED REGISTERED

## 2020-11-16 PROCEDURE — 3600000015 HC SURGERY LEVEL 5 ADDTL 15MIN: Performed by: ORTHOPAEDIC SURGERY

## 2020-11-16 PROCEDURE — 2580000003 HC RX 258: Performed by: ANESTHESIOLOGY

## 2020-11-16 DEVICE — R3 20 DEGREE XLPE ACETABULAR LINER                                    32MM INNER DIAMETER X OUTER DIAMETER 50MM
Type: IMPLANTABLE DEVICE | Site: HIP | Status: FUNCTIONAL
Brand: R3

## 2020-11-16 DEVICE — R3 SCREW HOLE COVER
Type: IMPLANTABLE DEVICE | Site: HIP | Status: FUNCTIONAL
Brand: R3

## 2020-11-16 DEVICE — REFLECTION THREADED HOLE COVER
Type: IMPLANTABLE DEVICE | Site: HIP | Status: FUNCTIONAL
Brand: REFLECTION

## 2020-11-16 DEVICE — OXINIUM FEMORAL HEAD 12/14 TAPER                                    32MM -3
Type: IMPLANTABLE DEVICE | Site: HIP | Status: FUNCTIONAL
Brand: OXINIUM

## 2020-11-16 DEVICE — SYNERGY POROUS FEMORAL COMPONENT SZ 14
Type: IMPLANTABLE DEVICE | Site: HIP | Status: FUNCTIONAL
Brand: SYNERGY

## 2020-11-16 DEVICE — R3 3 HOLE ACETABULAR SHELL 50MM
Type: IMPLANTABLE DEVICE | Site: HIP | Status: FUNCTIONAL
Brand: R3 ACETABULAR

## 2020-11-16 DEVICE — REFLECTION SPHERICAL HEAD SCREW 25MM
Type: IMPLANTABLE DEVICE | Site: HIP | Status: FUNCTIONAL
Brand: REFLECTION

## 2020-11-16 RX ORDER — DEXAMETHASONE SODIUM PHOSPHATE 4 MG/ML
INJECTION, SOLUTION INTRA-ARTICULAR; INTRALESIONAL; INTRAMUSCULAR; INTRAVENOUS; SOFT TISSUE PRN
Status: DISCONTINUED | OUTPATIENT
Start: 2020-11-16 | End: 2020-11-16 | Stop reason: SDUPTHER

## 2020-11-16 RX ORDER — SENNA AND DOCUSATE SODIUM 50; 8.6 MG/1; MG/1
1 TABLET, FILM COATED ORAL 2 TIMES DAILY
Status: DISCONTINUED | OUTPATIENT
Start: 2020-11-16 | End: 2020-11-17 | Stop reason: HOSPADM

## 2020-11-16 RX ORDER — OXYCODONE HYDROCHLORIDE AND ACETAMINOPHEN 5; 325 MG/1; MG/1
2 TABLET ORAL PRN
Status: DISCONTINUED | OUTPATIENT
Start: 2020-11-16 | End: 2020-11-16 | Stop reason: HOSPADM

## 2020-11-16 RX ORDER — ATORVASTATIN CALCIUM 10 MG/1
20 TABLET, FILM COATED ORAL NIGHTLY
Status: DISCONTINUED | OUTPATIENT
Start: 2020-11-16 | End: 2020-11-17 | Stop reason: HOSPADM

## 2020-11-16 RX ORDER — FLUOXETINE HYDROCHLORIDE 20 MG/1
20 CAPSULE ORAL DAILY
Status: DISCONTINUED | OUTPATIENT
Start: 2020-11-16 | End: 2020-11-17 | Stop reason: HOSPADM

## 2020-11-16 RX ORDER — ROCURONIUM BROMIDE 10 MG/ML
INJECTION, SOLUTION INTRAVENOUS PRN
Status: DISCONTINUED | OUTPATIENT
Start: 2020-11-16 | End: 2020-11-16 | Stop reason: SDUPTHER

## 2020-11-16 RX ORDER — PROPOFOL 10 MG/ML
INJECTION, EMULSION INTRAVENOUS PRN
Status: DISCONTINUED | OUTPATIENT
Start: 2020-11-16 | End: 2020-11-16 | Stop reason: SDUPTHER

## 2020-11-16 RX ORDER — SODIUM CHLORIDE 0.9 % (FLUSH) 0.9 %
10 SYRINGE (ML) INJECTION PRN
Status: DISCONTINUED | OUTPATIENT
Start: 2020-11-16 | End: 2020-11-17 | Stop reason: HOSPADM

## 2020-11-16 RX ORDER — GABAPENTIN 300 MG/1
300 CAPSULE ORAL 3 TIMES DAILY
Status: DISCONTINUED | OUTPATIENT
Start: 2020-11-16 | End: 2020-11-17 | Stop reason: HOSPADM

## 2020-11-16 RX ORDER — POTASSIUM CHLORIDE 20 MEQ/1
20 TABLET, EXTENDED RELEASE ORAL DAILY
COMMUNITY

## 2020-11-16 RX ORDER — ACETAMINOPHEN 500 MG
1000 TABLET ORAL ONCE
Status: COMPLETED | OUTPATIENT
Start: 2020-11-16 | End: 2020-11-16

## 2020-11-16 RX ORDER — CELECOXIB 100 MG/1
100 CAPSULE ORAL 2 TIMES DAILY
Status: DISCONTINUED | OUTPATIENT
Start: 2020-11-16 | End: 2020-11-17 | Stop reason: HOSPADM

## 2020-11-16 RX ORDER — ONDANSETRON 2 MG/ML
4 INJECTION INTRAMUSCULAR; INTRAVENOUS EVERY 30 MIN PRN
Status: DISCONTINUED | OUTPATIENT
Start: 2020-11-16 | End: 2020-11-16 | Stop reason: HOSPADM

## 2020-11-16 RX ORDER — DIPHENHYDRAMINE HYDROCHLORIDE 50 MG/ML
6.25 INJECTION INTRAMUSCULAR; INTRAVENOUS
Status: DISCONTINUED | OUTPATIENT
Start: 2020-11-16 | End: 2020-11-16 | Stop reason: HOSPADM

## 2020-11-16 RX ORDER — OXYCODONE HYDROCHLORIDE 5 MG/1
10 TABLET ORAL EVERY 4 HOURS PRN
Status: DISCONTINUED | OUTPATIENT
Start: 2020-11-16 | End: 2020-11-17 | Stop reason: HOSPADM

## 2020-11-16 RX ORDER — HYDRALAZINE HYDROCHLORIDE 20 MG/ML
5 INJECTION INTRAMUSCULAR; INTRAVENOUS EVERY 30 MIN PRN
Status: DISCONTINUED | OUTPATIENT
Start: 2020-11-16 | End: 2020-11-16 | Stop reason: HOSPADM

## 2020-11-16 RX ORDER — LIDOCAINE HYDROCHLORIDE 20 MG/ML
INJECTION, SOLUTION INFILTRATION; PERINEURAL PRN
Status: DISCONTINUED | OUTPATIENT
Start: 2020-11-16 | End: 2020-11-16 | Stop reason: SDUPTHER

## 2020-11-16 RX ORDER — ALBUTEROL SULFATE 2.5 MG/3ML
2.5 SOLUTION RESPIRATORY (INHALATION) EVERY 6 HOURS PRN
Status: DISCONTINUED | OUTPATIENT
Start: 2020-11-16 | End: 2020-11-17 | Stop reason: HOSPADM

## 2020-11-16 RX ORDER — LABETALOL HYDROCHLORIDE 5 MG/ML
5 INJECTION, SOLUTION INTRAVENOUS
Status: DISCONTINUED | OUTPATIENT
Start: 2020-11-16 | End: 2020-11-16 | Stop reason: HOSPADM

## 2020-11-16 RX ORDER — HYDROCHLOROTHIAZIDE 25 MG/1
12.5 TABLET ORAL DAILY
Status: DISCONTINUED | OUTPATIENT
Start: 2020-11-16 | End: 2020-11-17 | Stop reason: HOSPADM

## 2020-11-16 RX ORDER — OXYCODONE HYDROCHLORIDE AND ACETAMINOPHEN 5; 325 MG/1; MG/1
1 TABLET ORAL PRN
Status: DISCONTINUED | OUTPATIENT
Start: 2020-11-16 | End: 2020-11-16 | Stop reason: HOSPADM

## 2020-11-16 RX ORDER — MORPHINE SULFATE 4 MG/ML
4 INJECTION, SOLUTION INTRAMUSCULAR; INTRAVENOUS
Status: DISCONTINUED | OUTPATIENT
Start: 2020-11-16 | End: 2020-11-17 | Stop reason: HOSPADM

## 2020-11-16 RX ORDER — POLYETHYLENE GLYCOL 3350 17 G/17G
17 POWDER, FOR SOLUTION ORAL DAILY PRN
Status: DISCONTINUED | OUTPATIENT
Start: 2020-11-16 | End: 2020-11-17 | Stop reason: HOSPADM

## 2020-11-16 RX ORDER — MEPERIDINE HYDROCHLORIDE 50 MG/ML
12.5 INJECTION INTRAMUSCULAR; INTRAVENOUS; SUBCUTANEOUS EVERY 5 MIN PRN
Status: DISCONTINUED | OUTPATIENT
Start: 2020-11-16 | End: 2020-11-16 | Stop reason: HOSPADM

## 2020-11-16 RX ORDER — ACETAMINOPHEN 325 MG/1
650 TABLET ORAL EVERY 6 HOURS
Status: DISCONTINUED | OUTPATIENT
Start: 2020-11-16 | End: 2020-11-17 | Stop reason: HOSPADM

## 2020-11-16 RX ORDER — CELECOXIB 100 MG/1
200 CAPSULE ORAL ONCE
Status: COMPLETED | OUTPATIENT
Start: 2020-11-16 | End: 2020-11-16

## 2020-11-16 RX ORDER — SODIUM CHLORIDE 0.9 % (FLUSH) 0.9 %
10 SYRINGE (ML) INJECTION EVERY 12 HOURS SCHEDULED
Status: DISCONTINUED | OUTPATIENT
Start: 2020-11-16 | End: 2020-11-17 | Stop reason: HOSPADM

## 2020-11-16 RX ORDER — FENTANYL CITRATE 50 UG/ML
INJECTION, SOLUTION INTRAMUSCULAR; INTRAVENOUS PRN
Status: DISCONTINUED | OUTPATIENT
Start: 2020-11-16 | End: 2020-11-16 | Stop reason: SDUPTHER

## 2020-11-16 RX ORDER — GABAPENTIN 300 MG/1
300 CAPSULE ORAL ONCE
Status: COMPLETED | OUTPATIENT
Start: 2020-11-16 | End: 2020-11-16

## 2020-11-16 RX ORDER — MORPHINE SULFATE 2 MG/ML
2 INJECTION, SOLUTION INTRAMUSCULAR; INTRAVENOUS
Status: DISCONTINUED | OUTPATIENT
Start: 2020-11-16 | End: 2020-11-17 | Stop reason: HOSPADM

## 2020-11-16 RX ORDER — HYDROMORPHONE HCL 110MG/55ML
PATIENT CONTROLLED ANALGESIA SYRINGE INTRAVENOUS PRN
Status: DISCONTINUED | OUTPATIENT
Start: 2020-11-16 | End: 2020-11-16 | Stop reason: SDUPTHER

## 2020-11-16 RX ORDER — SODIUM CHLORIDE 9 MG/ML
INJECTION, SOLUTION INTRAVENOUS CONTINUOUS
Status: DISCONTINUED | OUTPATIENT
Start: 2020-11-16 | End: 2020-11-17 | Stop reason: HOSPADM

## 2020-11-16 RX ORDER — CETIRIZINE HYDROCHLORIDE 10 MG/1
10 TABLET ORAL NIGHTLY
Status: DISCONTINUED | OUTPATIENT
Start: 2020-11-16 | End: 2020-11-17 | Stop reason: HOSPADM

## 2020-11-16 RX ORDER — PROMETHAZINE HYDROCHLORIDE 25 MG/1
12.5 TABLET ORAL EVERY 6 HOURS PRN
Status: DISCONTINUED | OUTPATIENT
Start: 2020-11-16 | End: 2020-11-17 | Stop reason: HOSPADM

## 2020-11-16 RX ORDER — ONDANSETRON 2 MG/ML
4 INJECTION INTRAMUSCULAR; INTRAVENOUS EVERY 6 HOURS PRN
Status: DISCONTINUED | OUTPATIENT
Start: 2020-11-16 | End: 2020-11-17 | Stop reason: HOSPADM

## 2020-11-16 RX ORDER — POTASSIUM CHLORIDE 20 MEQ/1
20 TABLET, EXTENDED RELEASE ORAL DAILY
Status: DISCONTINUED | OUTPATIENT
Start: 2020-11-16 | End: 2020-11-17 | Stop reason: HOSPADM

## 2020-11-16 RX ORDER — PHENYLEPHRINE HCL IN 0.9% NACL 1 MG/10 ML
SYRINGE (ML) INTRAVENOUS PRN
Status: DISCONTINUED | OUTPATIENT
Start: 2020-11-16 | End: 2020-11-16 | Stop reason: SDUPTHER

## 2020-11-16 RX ORDER — SODIUM CHLORIDE, SODIUM LACTATE, POTASSIUM CHLORIDE, CALCIUM CHLORIDE 600; 310; 30; 20 MG/100ML; MG/100ML; MG/100ML; MG/100ML
INJECTION, SOLUTION INTRAVENOUS CONTINUOUS
Status: DISCONTINUED | OUTPATIENT
Start: 2020-11-16 | End: 2020-11-16

## 2020-11-16 RX ORDER — OXYCODONE HYDROCHLORIDE 5 MG/1
5 TABLET ORAL EVERY 4 HOURS PRN
Status: DISCONTINUED | OUTPATIENT
Start: 2020-11-16 | End: 2020-11-17 | Stop reason: HOSPADM

## 2020-11-16 RX ORDER — ONDANSETRON 2 MG/ML
INJECTION INTRAMUSCULAR; INTRAVENOUS PRN
Status: DISCONTINUED | OUTPATIENT
Start: 2020-11-16 | End: 2020-11-16 | Stop reason: SDUPTHER

## 2020-11-16 RX ADMIN — CELECOXIB 100 MG: 100 CAPSULE ORAL at 21:03

## 2020-11-16 RX ADMIN — ATORVASTATIN CALCIUM 20 MG: 10 TABLET, FILM COATED ORAL at 21:04

## 2020-11-16 RX ADMIN — FENTANYL CITRATE 150 MCG: 50 INJECTION, SOLUTION INTRAMUSCULAR; INTRAVENOUS at 07:37

## 2020-11-16 RX ADMIN — OXYCODONE HYDROCHLORIDE 10 MG: 5 TABLET ORAL at 21:03

## 2020-11-16 RX ADMIN — GABAPENTIN 300 MG: 300 CAPSULE ORAL at 13:36

## 2020-11-16 RX ADMIN — GABAPENTIN 300 MG: 300 CAPSULE ORAL at 21:04

## 2020-11-16 RX ADMIN — FENTANYL CITRATE 50 MCG: 50 INJECTION, SOLUTION INTRAMUSCULAR; INTRAVENOUS at 08:05

## 2020-11-16 RX ADMIN — CEFAZOLIN SODIUM 2 G: 10 INJECTION, POWDER, FOR SOLUTION INTRAVENOUS at 15:31

## 2020-11-16 RX ADMIN — PROPOFOL 200 MG: 10 INJECTION, EMULSION INTRAVENOUS at 07:37

## 2020-11-16 RX ADMIN — POTASSIUM CHLORIDE 20 MEQ: 20 TABLET, EXTENDED RELEASE ORAL at 13:40

## 2020-11-16 RX ADMIN — ONDANSETRON 4 MG: 2 INJECTION INTRAMUSCULAR; INTRAVENOUS at 07:37

## 2020-11-16 RX ADMIN — Medication 10 ML: at 21:04

## 2020-11-16 RX ADMIN — CELECOXIB 200 MG: 100 CAPSULE ORAL at 06:21

## 2020-11-16 RX ADMIN — HYDROMORPHONE HYDROCHLORIDE 0.5 MG: 1 INJECTION, SOLUTION INTRAMUSCULAR; INTRAVENOUS; SUBCUTANEOUS at 09:43

## 2020-11-16 RX ADMIN — OXYCODONE 5 MG: 5 TABLET ORAL at 15:31

## 2020-11-16 RX ADMIN — GABAPENTIN 300 MG: 300 CAPSULE ORAL at 06:21

## 2020-11-16 RX ADMIN — FENTANYL CITRATE 50 MCG: 50 INJECTION, SOLUTION INTRAMUSCULAR; INTRAVENOUS at 08:02

## 2020-11-16 RX ADMIN — CEFAZOLIN SODIUM 2 G: 10 INJECTION, POWDER, FOR SOLUTION INTRAVENOUS at 07:44

## 2020-11-16 RX ADMIN — HYDROMORPHONE HYDROCHLORIDE 1 MG: 2 INJECTION INTRAMUSCULAR; INTRAVENOUS; SUBCUTANEOUS at 09:30

## 2020-11-16 RX ADMIN — ACETAMINOPHEN 1000 MG: 500 TABLET ORAL at 06:20

## 2020-11-16 RX ADMIN — ROCURONIUM BROMIDE 50 MG: 10 SOLUTION INTRAVENOUS at 07:37

## 2020-11-16 RX ADMIN — Medication 100 MCG: at 07:54

## 2020-11-16 RX ADMIN — SUGAMMADEX 200 MG: 100 INJECTION, SOLUTION INTRAVENOUS at 09:25

## 2020-11-16 RX ADMIN — DEXAMETHASONE SODIUM PHOSPHATE 8 MG: 4 INJECTION, SOLUTION INTRAMUSCULAR; INTRAVENOUS at 07:37

## 2020-11-16 RX ADMIN — SODIUM CHLORIDE: 9 INJECTION, SOLUTION INTRAVENOUS at 13:30

## 2020-11-16 RX ADMIN — ACETAMINOPHEN 650 MG: 325 TABLET ORAL at 13:37

## 2020-11-16 RX ADMIN — LIDOCAINE HYDROCHLORIDE 80 MG: 20 INJECTION, SOLUTION INFILTRATION; PERINEURAL at 07:37

## 2020-11-16 RX ADMIN — SODIUM CHLORIDE, SODIUM LACTATE, POTASSIUM CHLORIDE, AND CALCIUM CHLORIDE: .6; .31; .03; .02 INJECTION, SOLUTION INTRAVENOUS at 08:40

## 2020-11-16 RX ADMIN — CETIRIZINE HYDROCHLORIDE 10 MG: 10 TABLET, FILM COATED ORAL at 21:04

## 2020-11-16 RX ADMIN — FLUOXETINE 20 MG: 20 CAPSULE ORAL at 13:37

## 2020-11-16 RX ADMIN — ACETAMINOPHEN 650 MG: 325 TABLET ORAL at 21:04

## 2020-11-16 RX ADMIN — DOCUSATE SODIUM 50MG AND SENNOSIDES 8.6MG 1 TABLET: 8.6; 5 TABLET, FILM COATED ORAL at 21:04

## 2020-11-16 RX ADMIN — HYDROCHLOROTHIAZIDE 12.5 MG: 25 TABLET ORAL at 13:37

## 2020-11-16 RX ADMIN — SODIUM CHLORIDE, SODIUM LACTATE, POTASSIUM CHLORIDE, AND CALCIUM CHLORIDE: .6; .31; .03; .02 INJECTION, SOLUTION INTRAVENOUS at 07:17

## 2020-11-16 ASSESSMENT — PAIN SCALES - GENERAL
PAINLEVEL_OUTOF10: 4
PAINLEVEL_OUTOF10: 5
PAINLEVEL_OUTOF10: 5
PAINLEVEL_OUTOF10: 10
PAINLEVEL_OUTOF10: 5
PAINLEVEL_OUTOF10: 7
PAINLEVEL_OUTOF10: 3
PAINLEVEL_OUTOF10: 7
PAINLEVEL_OUTOF10: 9
PAINLEVEL_OUTOF10: 0

## 2020-11-16 ASSESSMENT — PULMONARY FUNCTION TESTS
PIF_VALUE: 17
PIF_VALUE: 15
PIF_VALUE: 20
PIF_VALUE: 16
PIF_VALUE: 19
PIF_VALUE: 20
PIF_VALUE: 18
PIF_VALUE: 3
PIF_VALUE: 16
PIF_VALUE: 17
PIF_VALUE: 19
PIF_VALUE: 19
PIF_VALUE: 17
PIF_VALUE: 16
PIF_VALUE: 15
PIF_VALUE: 34
PIF_VALUE: 17
PIF_VALUE: 17
PIF_VALUE: 21
PIF_VALUE: 17
PIF_VALUE: 17
PIF_VALUE: 2
PIF_VALUE: 17
PIF_VALUE: 16
PIF_VALUE: 1
PIF_VALUE: 17
PIF_VALUE: 20
PIF_VALUE: 17
PIF_VALUE: 20
PIF_VALUE: 16
PIF_VALUE: 17
PIF_VALUE: 16
PIF_VALUE: 17
PIF_VALUE: 19
PIF_VALUE: 15
PIF_VALUE: 15
PIF_VALUE: 17
PIF_VALUE: 19
PIF_VALUE: 17
PIF_VALUE: 15
PIF_VALUE: 19
PIF_VALUE: 19
PIF_VALUE: 20
PIF_VALUE: 19
PIF_VALUE: 18
PIF_VALUE: 19
PIF_VALUE: 16
PIF_VALUE: 16
PIF_VALUE: 3
PIF_VALUE: 35
PIF_VALUE: 17
PIF_VALUE: 22
PIF_VALUE: 18
PIF_VALUE: 19
PIF_VALUE: 27
PIF_VALUE: 20
PIF_VALUE: 1
PIF_VALUE: 20
PIF_VALUE: 18
PIF_VALUE: 19
PIF_VALUE: 18
PIF_VALUE: 17
PIF_VALUE: 20
PIF_VALUE: 17
PIF_VALUE: 19
PIF_VALUE: 17
PIF_VALUE: 15
PIF_VALUE: 20
PIF_VALUE: 20
PIF_VALUE: 17
PIF_VALUE: 18
PIF_VALUE: 18
PIF_VALUE: 17
PIF_VALUE: 17
PIF_VALUE: 16
PIF_VALUE: 12
PIF_VALUE: 17
PIF_VALUE: 19
PIF_VALUE: 19
PIF_VALUE: 18
PIF_VALUE: 19
PIF_VALUE: 17
PIF_VALUE: 17
PIF_VALUE: 16
PIF_VALUE: 17
PIF_VALUE: 16
PIF_VALUE: 17
PIF_VALUE: 14
PIF_VALUE: 18
PIF_VALUE: 16
PIF_VALUE: 15
PIF_VALUE: 18
PIF_VALUE: 19
PIF_VALUE: 20
PIF_VALUE: 17
PIF_VALUE: 20
PIF_VALUE: 1
PIF_VALUE: 20
PIF_VALUE: 15
PIF_VALUE: 17
PIF_VALUE: 17
PIF_VALUE: 18
PIF_VALUE: 14
PIF_VALUE: 18
PIF_VALUE: 16
PIF_VALUE: 15
PIF_VALUE: 17
PIF_VALUE: 20
PIF_VALUE: 16
PIF_VALUE: 1
PIF_VALUE: 15
PIF_VALUE: 20
PIF_VALUE: 18
PIF_VALUE: 17
PIF_VALUE: 1
PIF_VALUE: 2
PIF_VALUE: 17
PIF_VALUE: 16

## 2020-11-16 ASSESSMENT — PAIN DESCRIPTION - ORIENTATION
ORIENTATION: LEFT

## 2020-11-16 ASSESSMENT — PAIN DESCRIPTION - LOCATION
LOCATION: HIP

## 2020-11-16 ASSESSMENT — PAIN DESCRIPTION - FREQUENCY
FREQUENCY: CONTINUOUS
FREQUENCY: INTERMITTENT

## 2020-11-16 ASSESSMENT — PAIN DESCRIPTION - PAIN TYPE
TYPE: SURGICAL PAIN

## 2020-11-16 ASSESSMENT — PAIN DESCRIPTION - DESCRIPTORS
DESCRIPTORS: ACHING;BURNING
DESCRIPTORS: ACHING
DESCRIPTORS: ACHING;SORE

## 2020-11-16 ASSESSMENT — PAIN DESCRIPTION - PROGRESSION: CLINICAL_PROGRESSION: GRADUALLY IMPROVING

## 2020-11-16 ASSESSMENT — PAIN - FUNCTIONAL ASSESSMENT: PAIN_FUNCTIONAL_ASSESSMENT: 0-10

## 2020-11-16 NOTE — ANESTHESIA PRE PROCEDURE
Department of Anesthesiology  Preprocedure Note       Name:  Alma Junior   Age:  64 y.o.  :  1960                                          MRN:  [id number]         Date:  2020      Surgeon: Rosanne Schroeder):  Sol Taylor MD    Procedure: Procedure(s):  LEFT TOTAL HIP REPLACEMENT WITH CELLSAVER        DIAZ & NEPHEW    Medications prior to admission:   Prior to Admission medications    Medication Sig Start Date End Date Taking? Authorizing Provider   potassium chloride (KLOR-CON M) 20 MEQ extended release tablet Take 20 mEq by mouth daily   Yes Historical Provider, MD   HYDROcodone-acetaminophen (NORCO) 5-325 MG per tablet Take 1 tablet by mouth every 12 hours for 7 days.  Take lowest dose possible to manage pain 20 Yes LUZ MARIA Juarez   atorvastatin (LIPITOR) 20 MG tablet Take 20 mg by mouth nightly   Yes Historical Provider, MD   FLUoxetine (PROZAC) 20 MG capsule Take 20 mg by mouth daily   Yes Historical Provider, MD   hydrochlorothiazide (HYDRODIURIL) 12.5 MG tablet Take 12.5 mg by mouth daily  18  Yes Historical Provider, MD   cetirizine (ZYRTEC) 10 MG tablet Take 10 mg by mouth nightly  18  Yes Historical Provider, MD   albuterol sulfate  (90 Base) MCG/ACT inhaler Inhale 2 puffs into the lungs every 6 hours as needed for Wheezing    Historical Provider, MD       Current medications:    Current Facility-Administered Medications   Medication Dose Route Frequency Provider Last Rate Last Dose    ceFAZolin (ANCEF) 2 g in dextrose 5 % 100 mL IVPB  2 g Intravenous On Call to Deepak Ibrahim MD        celecoxib (CELEBREX) capsule 100 mg  100 mg Oral BID Nurys Slater MD        gabapentin (NEURONTIN) capsule 300 mg  300 mg Oral TID Nurys Slater MD        lactated ringers infusion   Intravenous Continuous Nurys Slater MD        lidocaine 1 % (PF) injection 0.1 mL  0.1 mL Intradermal Once PRN Nurys Slater MD        HYDROmorphone (DILAUDID) injection 0.5 mg  0.5 mg Intravenous Q10 Min PRN Carla Chowdhury MD        HYDROmorphone (DILAUDID) injection 0.5 mg  0.5 mg Intravenous Q5 Min PRN Carla Chowdhury MD        oxyCODONE-acetaminophen (PERCOCET) 5-325 MG per tablet 1 tablet  1 tablet Oral PRN Carla Chowdhury MD        Or    oxyCODONE-acetaminophen (PERCOCET) 5-325 MG per tablet 2 tablet  2 tablet Oral PRN Carla Chowdhury MD        diphenhydrAMINE (BENADRYL) injection 6.25 mg  6.25 mg Intravenous Once PRN Carla Chowdhury MD        ondansetron [facility]) injection 4 mg  4 mg Intravenous Q30 Min PRN Carla Chowdhury MD        labetalol (NORMODYNE;TRANDATE) injection 5 mg  5 mg Intravenous Q15 Min PRN Carla Chowdhury MD        hydrALAZINE (APRESOLINE) injection 5 mg  5 mg Intravenous Q30 Min PRN Carla Chowdhury MD        meperidine (DEMEROL) injection 12.5 mg  12.5 mg Intravenous Q5 Min PRN Carla Chowdhury MD           Allergies:  No Known Allergies    Problem List:    Patient Active Problem List   Diagnosis Code    History of total right hip replacement Z96.641    Avascular necrosis of bone of right hip (HCC) M87.051    Essential hypertension I10    Mild intermittent asthma without complication I48.45       Past Medical History:        Diagnosis Date    Arthritis     Asthma     Depression     Hyperlipidemia     Hypertension        Past Surgical History:        Procedure Laterality Date     SECTION      HYSTERECTOMY      IN NJX DX/THER SBST INTRLMNR CRV/THRC W/IMG GDN Bilateral 2018    BILATERAL LUMBAR FIVE TRANSFORAMINAL EPIDURAL STEROID INJECTION SITE CONFIRMED BY FLUOROSCOPY performed by Marialuisa Mackey MD at [facility] DX/THER SBST INTRLMNR CRV/THRC W/IMG GDN N/A 10/16/2018    RIGHT LUMBAR THREE AND LEFT LUMBAR FIVE TRANSFORAMINAL EPIDURAL STEROID INJECTION SITE CONFIRMED BY FLUOROSCOPY performed by Marialuisa Mackey MD at [facility] OR    OR NJX DX/THER SBST INTRLMNR CRV/THRC W/IMG GDN Right 10/30/2018    RIGHT LUMBAR THREE AND LEFT LUMBAR FIVE TRANSFORAMINAL EPIDURAL STEROID INJECTION SITE CONFIRMED BY FLUOROSCOPY performed by Taylor Ramirez MD at [address] Right 2020    RIGHT TOTAL HIP REPLACEMENT performed by Annmarie Giraldo MD at [place] [address] History:    Social History     Tobacco Use    Smoking status: Former Smoker     Packs/day: 1.00     Years: 15.00     Pack years: 15.00     Last attempt to quit: 2003     Years since quittin.0    Smokeless tobacco: Never Used   Substance Use Topics    Alcohol use: No     Comment: VERY OCCAISIONAL                                Counseling given: Not Answered      Vital Signs (Current):   Vitals:    20 1558 20 0556   BP:  (!) 143/70   Pulse:  79   Resp:  16   Temp:  98.1 °F (36.7 °C)   TempSrc:  Temporal   SpO2:  95%   Weight: 180 lb (81.6 kg) 177 lb 2 oz (80.3 kg)   Height: 5' 3\" (1.6 m) 5' 3\" (1.6 m)                                              BP Readings from Last 3 Encounters:   20 (!) 143/70   20 (!) 115/54   20 (!) 107/53       NPO Status: Time of last liquid consumption: 2300                        Time of last solid consumption: 1800                        Date of last liquid consumption: 11/15/20                        Date of last solid food consumption: 11/15/20    BMI:   Wt Readings from Last 3 Encounters:   20 177 lb 2 oz (80.3 kg)   20 183 lb (83 kg)   20 183 lb (83 kg)     Body mass index is 31.38 kg/m².     CBC:   Lab Results   Component Value Date    WBC 9.4 2020    RBC 5.18 2020    HGB 14.0 2020    HCT 43.7 2020    MCV 84.5 2020    RDW 18.0 2020     2020       CMP:   Lab Results   Component Value Date     2020    K 3.4 2020    CL 96 2020    CO2 29 2020    BUN 17 2020    CREATININE 0.7 11/16/2020    GFRAA >60 11/16/2020    LABGLOM >60 11/16/2020    GLUCOSE 120 11/16/2020    CALCIUM 9.9 11/16/2020       POC Tests: No results for input(s): POCGLU, POCNA, POCK, POCCL, POCBUN, POCHEMO, POCHCT in the last 72 hours. Coags:   Lab Results   Component Value Date    PROTIME 11.5 11/02/2020    INR 0.99 11/02/2020    APTT 24.8 11/02/2020       HCG (If Applicable): No results found for: PREGTESTUR, PREGSERUM, HCG, HCGQUANT     ABGs: No results found for: PHART, PO2ART, WEB3SRU, AQB2WSF, BEART, G1PBMYUW     Type & Screen (If Applicable):  No results found for: LABABO, LABRH    Drug/Infectious Status (If Applicable):  No results found for: HIV, HEPCAB    COVID-19 Screening (If Applicable):   Lab Results   Component Value Date    COVID19 Not Detected 11/10/2020         Anesthesia Evaluation  Patient summary reviewed and Nursing notes reviewed no history of anesthetic complications:   Airway: Mallampati: II     Neck ROM: full   Dental:          Pulmonary:   (+) asthma:                            Cardiovascular:    (+) hypertension:,                   Neuro/Psych:   (+) psychiatric history:            GI/Hepatic/Renal:             Endo/Other:                     Abdominal:           Vascular:                                      Anesthesia Plan      general and regional     ASA 2     (Medications & allergies reviewed  All available lab & EKG data reviewed  FIB for POA)  Induction: intravenous. Anesthetic plan and risks discussed with patient. Plan discussed with CRNA.                   Chris Rehman MD   11/16/2020

## 2020-11-16 NOTE — PROGRESS NOTES
4 Eyes Skin Assessment     The patient is being assess for   Admission    I agree that 2 RN's have performed a thorough Head to Toe Skin Assessment on the patient. ALL assessment sites listed below have been assessed. Areas assessed by both nurses:   [x]   Head, Face, and Ears   [x]   Shoulders, Back, and Chest, Abdomen  [x]   Arms, Elbows, and Hands   [x]   Coccyx, Sacrum, and Ischium  [x]   Legs, Feet, and Heels    None    **SHARE this note so that the co-signing nurse is able to place an eSignature**    Co-signer eSignature: Electronically signed by Antoine Costa RN on 11/16/20 at 12:28 PM EST    Does the Patient have Skin Breakdown?   No          Adam Prevention initiated:  No   Wound Care Orders initiated:  No      [facility] nurse consulted for Pressure Injury (Stage 3,4, Unstageable, DTI, NWPT, Complex wounds)and New or Established Ostomies:  No      Primary Nurse eSignature: Electronically signed by Rosa Turpin RN on 11/16/20 at 2:57 PM EST

## 2020-11-16 NOTE — PROGRESS NOTES
Occupational Therapy   Occupational Therapy Initial Assessment  Date: 2020   Patient Name: Laura Capone  MRN: [id number]     : 1960    Date of Service: 2020    Discharge Recommendations:  Home with assist PRN       Assessment    Performance deficits / Impairments: Decreased functional mobility ; Decreased ADL status; Decreased balance  After evaluation POD #0 s/p L THR (Ant. Lateral approach)patient , pt found to be presenting with the above mentioned occupational performance deficits which are affecting participation in daily living skills. Pt CGA for functional mobility and toilet transfers using SW for mobility and Kamilah for LE dressing. Pt would benefit from continued skilled occupational therapy to address ADLs, functional mobility, and safety while in acute care. Prognosis: Good  Decision Making: Low Complexity  OT Education: OT Role;Plan of Care;Precautions; ADL Adaptive Strategies;Transfer Training;IADL Safety;Equipment  Patient Education: Disease specific:In susie of hip precautions/wb'ing restrictions, educated patient on compensatory methods for LE ADLs. Pt verbalized understanding. Performance deficits / Impairments: Decreased functional mobility ; Decreased ADL status; Decreased balance  Prognosis: Good  Decision Making: Low Complexity  OT Education: OT Role;Plan of Care;Precautions; ADL Adaptive Strategies;Transfer Training;IADL Safety;Equipment  Patient Education: Disease specific:  REQUIRES OT FOLLOW UP: Yes  Activity Tolerance  Activity Tolerance: Patient Tolerated treatment well  Activity Tolerance: Vitals: Bp after mobility 134/77  Safety Devices  Safety Devices in place: Yes  Type of devices: Left in chair;Call light within reach;Nurse notified; Chair alarm in place;Gait belt           Patient Diagnosis(es): The encounter diagnosis was Primary osteoarthritis of left hip.     has a past medical history of Arthritis, Asthma, Depression, Hyperlipidemia, and Hypertension.    has a past surgical history that includes Hysterectomy;  section; pr njx dx/ther sbst intrlmnr crv/thrc w/img gdn (Bilateral, 2018); pr njx dx/ther sbst intrlmnr crv/thrc w/img gdn (N/A, 10/16/2018); pr njx dx/ther sbst intrlmnr crv/thrc w/img gdn (Right, 10/30/2018); and Total hip arthroplasty (Right, 2020). Restrictions  Restrictions/Precautions  Restrictions/Precautions: Weight Bearing, ROM Restrictions, Fall Risk  Lower Extremity Weight Bearing Restrictions  Left Lower Extremity Weight Bearing: Partial Weight Bearing  Partial Weight Bearing Percentage Or Pounds: 50%  Position Activity Restriction  Hip Precautions: No ADduction, No hip external rotation, No hip extension    Subjective   General  Chart Reviewed: Yes  Patient assessed for rehabilitation services?: Yes  Family / Caregiver Present: No  Referring Practitioner: LUZ MARIA Mayfield; Dr. Cj Li 2020  Diagnosis: L hip OA, s/p L anterior lateral approach THR2020  Patient Currently in Pain: Yes  Pain Assessment  Pain Assessment: 0-10  Pain Level: 5  Pain Type: Surgical pain  Pain Location: Hip  Pain Orientation: Left  Pain Descriptors: Aching  Non-Pharmaceutical Pain Intervention(s): Ambulation/Increased Activity;Cold applied;Repositioned; Therapeutic presence  Response to Pain Intervention: Patient Satisfied  Pre Treatment Pain Screening  Intervention List: Patient able to continue with treatment    Social/Functional History  Social/Functional History  Lives With: Alone  Type of Home: Apartment  Home Layout: One level  Home Access: Stairs to enter with rails  Entrance Stairs - Number of Steps: 7-8 CHNECHO down into basement level  Entrance Stairs - Rails: Both  Bathroom Shower/Tub: Tub/Shower unit  Bathroom Toilet: Handicap height  Bathroom Equipment: Hand-held shower, Shower chair, Toilet raiser  Home Equipment: Rolling walker, Cane, Standard walker  ADL Assistance: Independent  Homemaking Assistance: Independent(daughter helps prn for cleaning since having increased pain.)  Ambulation Assistance: Independent  Transfer Assistance: Independent  Active : Yes  Occupation: Retired  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, cleaning apartment  [address]: [facility]       Objective        Orientation  Overall Orientation Status: Within [address]  Sitting Balance: Supervision  Standing Balance: Contact guard assistance(with LE clothing mgmt post toileting)  Functional Mobility  Functional - Mobility Device: Standard Walker  Activity: To/from bathroom  Assist Level: Contact guard assistance  Toilet Transfers  Toilet - Technique: Ambulating(with RW)  Equipment Used: Standard toilet  Toilet Transfer: Contact guard assistance     ADL  LE Dressing: Minimal assistance(donning briefs)  Toileting: Stand by assistance(for LE clothing mgmt)     Tone RUE  RUE Tone: Normotonic  Tone LUE  LUE Tone: Normotonic  Coordination  Movements Are Fluid And Coordinated: Yes     Bed mobility  Supine to Sit: Supervision(to R w/ HOB elevated)  Sit to Supine: Unable to assess(up to chair at end of session)  Transfers  Sit to stand: Contact guard assistance(up to SW with cues for safe hand placement)  Stand to sit: Contact guard assistance     Cognition  Overall Cognitive Status: WFL     LUE AROM (degrees)  LUE AROM : WFL  RUE AROM (degrees)  RUE AROM : WFL  LUE Strength  Gross LUE Strength: WFL  RUE Strength  Gross RUE Strength: WFL      Plan   Plan  Times per week: 4-6x's a week while in acute care    AM-PAC Score        AM-PAC Inpatient Daily Activity Raw Score: 20 (11/16/20 1717)  AM-PAC Inpatient ADL T-Scale Score : 42.03 (11/16/20 1717)  ADL Inpatient CMS 0-100% Score: 38.32 (11/16/20 1717)  ADL Inpatient CMS G-Code Modifier : Perez Backers (11/16/20 1717)    Goals  Short term goals  Time Frame for Short term goals: 1 week unless otherwise specified 11/19  Short term goal 1: Pt will verbalize understanding of safe car transfers after education by 11/17  Short term goal 2: Pt will complete LE dressing with SBA  Short term goal 3: Pt will complete toilet transfers with SBA  Patient Goals   Patient goals : \"to be able to get my underwear on independently before I leave\"       Therapy Time   Individual Concurrent Group Co-treatment   Time In 1430         Time Out 1505         Minutes 35         Timed Code Treatment Minutes: 25 Minutes       Sachin Mckinley OTR/L  If pt is unable to be seen after this session, please let this note serve as discharge summary. Please see case management note for discharge disposition. Thank you.

## 2020-11-16 NOTE — ANESTHESIA POSTPROCEDURE EVALUATION
Department of Anesthesiology  Postprocedure Note    Patient: Bailee Ahn  MRN: [id number]  YOB: 1960  Date of evaluation: 11/16/2020  Time:  1:14 PM     Procedure Summary     Date:  11/16/20 Room / Location:  [place] [address] / [place] campus    Anesthesia Start:  0730 Anesthesia Stop:  6484    Procedure:  LEFT TOTAL HIP REPLACEMENT WITH Letta Comment & NEPHEW (Left Hip) Diagnosis:       Primary osteoarthritis of left hip      (LEFT HIP OSTEOARTHRITIS)    Surgeon:  Leah Farmer MD Responsible Provider:  Cathryn Mckeon MD    Anesthesia Type:  general, regional ASA Status:  2          Anesthesia Type: general, regional    Tamie Phase I: Tamie Score: 9    Tamie Phase II:      Last vitals: Reviewed and per EMR flowsheets.        Anesthesia Post Evaluation    Comments: Postoperative Anesthesia Note    Name:    Bailee Ahn  MRN:      [id number]    Patient Vitals in the past 12 hrs:  11/16/20 1230, BP:100/62, Temp:97.7 °F (36.5 °C), Temp src:Oral, Pulse:76, Resp:18, SpO2:90 %  11/16/20 1130, BP:129/71, Pulse:66, Resp:21, SpO2:97 %  11/16/20 1115, BP:132/74, Pulse:73, Resp:18, SpO2:91 %  11/16/20 1100, Pulse:70, Resp:10, SpO2:97 %  11/16/20 1045, BP:138/78, Pulse:70, Resp:12, SpO2:98 %  11/16/20 1030, BP:(!) 143/75, Pulse:68, Resp:12, SpO2:98 %  11/16/20 1010, Pulse:67  11/16/20 0943, SpO2:99 %  11/16/20 0556, BP:(!) 143/70, Temp:98.1 °F (36.7 °C), Temp src:Temporal, Pulse:79, Resp:16, SpO2:95 %, Height:5' 3\" (1.6 m), Weight:177 lb 2 oz (80.3 kg)     LABS:    CBC  Lab Results       Component                Value               Date/Time                  WBC                      9.4                 11/02/2020 03:04 PM        HGB                      14.0                11/02/2020 03:04 PM        HCT                      43.7                11/02/2020 03:04 PM        PLT                      341                 11/02/2020 03:04 PM   RENAL  Lab Results       Component Value               Date/Time                  NA                       136                 11/16/2020 06:19 AM        K                        3.4 (L)             11/16/2020 06:19 AM        CL                       96 (L)              11/16/2020 06:19 AM        CO2                      29                  11/16/2020 06:19 AM        BUN                      17                  11/16/2020 06:19 AM        CREATININE               0.7                 11/16/2020 06:19 AM        GLUCOSE                  120 (H)             11/16/2020 06:19 AM   COAGS  Lab Results       Component                Value               Date/Time                  PROTIME                  11.5                11/02/2020 03:04 PM        INR                      0.99                11/02/2020 03:04 PM        APTT                     24.8                11/02/2020 03:04 PM     Intake & Output: In: 1700 (P.O.:100; I.V.:1400; Blood:200)  Out: 400     Nausea & Vomiting:  No    Level of Consciousness:  Awake    Pain Assessment:  Adequate analgesia    Anesthesia Complications:  No apparent anesthetic complications    SUMMARY      Vital signs stable  OK to discharge from Stage I post anesthesia care.   Care transferred from Anesthesiology department on discharge from perioperative area

## 2020-11-16 NOTE — CONSULTS
Hospital Medicine  Consult History & Physical        Chief Complaint: S/p left total hip replacement    Date of Service: Pt seen/examined in consultation on 2020    History Of Present Illness:      64 y.o. female who we are asked to see/evaluate by Lennox Farley MD for medical management. He has no pain, she does not have any nausea vomiting chest pain shortness of breath fever or change in mental status. Past Medical History:        Diagnosis Date    Arthritis     Asthma     Depression     Hyperlipidemia     Hypertension        Past Surgical History:        Procedure Laterality Date     SECTION      HYSTERECTOMY      [place] NJX DX/THER SBST INTRLMNR CRV/THRC W/IMG GDN Bilateral 2018    BILATERAL LUMBAR FIVE TRANSFORAMINAL EPIDURAL STEROID INJECTION SITE CONFIRMED BY FLUOROSCOPY performed by Sherryn Claude, MD at [facility] DX/THER SBST INTRLMNR CRV/THRC W/IMG GDN N/A 10/16/2018    RIGHT LUMBAR THREE AND LEFT LUMBAR FIVE TRANSFORAMINAL EPIDURAL STEROID INJECTION SITE CONFIRMED BY FLUOROSCOPY performed by Sherryn Claude, MD at [facility] DX/THER SBST INTRLMNR CRV/THRC W/IMG GDN Right 10/30/2018    RIGHT LUMBAR THREE AND LEFT LUMBAR FIVE TRANSFORAMINAL EPIDURAL STEROID INJECTION SITE CONFIRMED BY FLUOROSCOPY performed by Sherryn Claude, MD at [place] [address] Right 2020    RIGHT TOTAL HIP REPLACEMENT performed by Lennox Farley MD at [facility] OR       Medications Prior to Admission:    Prior to Admission medications    Medication Sig Start Date End Date Taking? Authorizing Provider   potassium chloride (KLOR-CON M) 20 MEQ extended release tablet Take 20 mEq by mouth daily   Yes Historical Provider, MD   HYDROcodone-acetaminophen (NORCO) 5-325 MG per tablet Take 1 tablet by mouth every 12 hours for 7 days.  Take lowest dose possible to manage pain 20 Yes LUZ MARIA Sarmiento   atorvastatin hip replacement incision is dressed and there is no redness or swelling  Skin: Skin color, texture, turgor normal.  No rashes or lesions. Neurologic:  Neurovascularly intact without any focal sensory/motor deficits. Cranial nerves: II-XII intact, grossly non-focal.  Psychiatric: Alert and oriented, thought content appropriate, normal insight  Capillary Refill: Brisk,< 3 seconds   Peripheral Pulses: +2 palpable, equal bilaterally     Labs:     No results for input(s): WBC, HGB, HCT, PLT in the last 72 hours. Recent Labs     11/16/20  0619      K 3.4*   CL 96*   CO2 29   BUN 17   CREATININE 0.7   CALCIUM 9.9     No results for input(s): AST, ALT, BILIDIR, BILITOT, ALKPHOS in the last 72 hours. No results for input(s): INR in the last 72 hours. No results for input(s): Towana Neelam in the last 72 hours. Urinalysis:    Lab Results   Component Value Date    NITRU Negative 11/02/2020    WBCUA 0-2 07/14/2020    BACTERIA 2+ 07/14/2020    RBCUA None seen 07/14/2020    BLOODU Negative 11/02/2020    SPECGRAV 1.025 11/02/2020    GLUCOSEU Negative 11/02/2020       Radiology: I have reviewed the radiology reports with the following interpretation:     XR HIP LEFT (1 VIEW)   Final Result   Normal/expected postsurgical appearance. No evidence of complication. EKG:  I have reviewed the EKG with the following interpretation:    @ekgread@      ASSESSMENT:    Active Hospital Problems    Diagnosis Date Noted    Status post total replacement of left hip [Z96.642] 11/16/2020    Status post total hip replacement, left [Z96.642] 11/16/2020       PLAN:  Osteoarthritis s/p left hip replacement-pain control, monitor vitals, labs in the morning rehab and weightbearing defer to primary team    Hypokalemia-replace and monitor    Hypertension-hydrochlorothiazide and routine potassium blood pressure is stable    Hyperlipidemia-Lipitor    Depression-Prozac      DVT Prophylaxis: Eliquis  Diet: 2 g sodium;   Code Status: Full Code    PT/OT Eval Status: Active and ongoing    Dispo -primary team    Thank you for the consultation, will follow up as needed    Electronically signed by Kofi Ramirez MD on 11/16/20 at 2:27 PM EST

## 2020-11-16 NOTE — OP NOTE
[address] [address]                                OPERATIVE REPORT    PATIENT NAME: Phillip Babb                  :        1960  MED REC NO:   [id number]                          ROOM:       9704  ACCOUNT NO:   [de-identified]                           ADMIT DATE: 2020  PROVIDER:     Thom Kahn MD    DATE OF PROCEDURE:  2020    SERVICE:  Orthopedic Surgery. SURGEON:  Jordan Tristan MD    FIRST ASSISTANT:  LUZ MARIA Kinney    PREOPERATIVE DIAGNOSIS:  Severe osteoarthritis of the left hip (715.35). POSTOPERATIVE DIAGNOSIS:  Severe osteoarthritis of the left hip  (715.35). OPERATIVE PROCEDURE:  Left Smith and Nephew total hip replacement using  50-mm outer diameter R3 cup with a 125-mm screw, 32 x 50, 20-degree  offset liner, size 14 standard Synergy femoral stem with -3, 32-mm  Oxinium head. ESTIMATED BLOOD LOSS:  200 mL. TOURNIQUET:  Not applicable. DRAINS:  None. SPECIAL CONSIDERATIONS:  The patient's size on the opposite side was  52-mm outer diameter cup. However, on this side, the patient has an  excellent quality bleeding bed at 49 mm and I did not want to thin her  wall anymore than necessary with excellent quality bleeding bed. So I  elected to go ahead with the size 50 which then dropped us down to a  32-mm head. COMPLICATIONS:  None. POSITION:  Lateral decubitus position in contralateral hip from surgery  with the Vac-Terrence, superficial bony prominences carefully padded,  axillary roll. X-RAYS:  None. ANTIBIOTICS:  Per SCIP protocol based upon patient's age, weight, and  allergies. SPECIAL PROCEDURES:  The patient had Cell Saver and platelet gel  utilized throughout the procedure. Platelet gel was utilized on the  uncemented placement of the components as well as on the layered  closure. DISPOSITION:  To the recovery room.     INDICATIONS:  She is 64years old. The patient's history is well  documented in the records from [facility]. The patient has  failed nonoperative measures with respect to the osteoarthritic hip  regarding patient's pain and functional capabilities. There has been a lengthy discussion with the patient regarding the  risks, benefits, and potential complications of the operation as well as  a normal rehabilitative protocol. The patient specifically voiced  understanding to concerns regarding surgical infection, deep vein  thrombosis, dystrophy, arthrofibrosis, delayed rehabilitation,  dislocation of the prosthesis, periprosthetic fracture, neurologic  injury, etc.  We also talked about what would happen if the patient were  to undergo a hip dislocation and potential treatment in that regard. The patient voiced understanding to all of these concerns and elected to  have the procedure done. All questions were answered. DETAILS OF SURGERY:  The patient was seen in the preanesthesia holding  area by me, and I personally initialed the operative site. Any further  questions were also answered. The patient was then taken to the  operating room where anesthesia was induced and maintained by anesthesia  personnel. This was all documented in their records from [facility]. After the patient was positioned as described above, the hip was prepped  and draped with ChloraPrep. A longitudinal incision was made through the skin and carried down to  expose the tensor fascia steve and proximal iliotibial band. Longitudinal incision was made through these tissues. The Charnley  retractor was placed and the abductor mechanism was easily isolated. Any redundant greater trochanter bursa was removed to specifically  delineate the anatomy of the abductor mechanism. A bayonet release was  performed of the abductors, sparing the tendinous portion posteriorly.    The abductor musculature was then reflected off the anterior capsule of  the hip joint. Any bleeding encountered was controlled with a Bovie. With the hip capsule identified, a capsulotomy was performed and carried  up onto the edge of the acetabulum. A preliminary labral removal was  performed and, of course, dissection was carried along the medial side  of the femoral neck as well. The hip was then dislocated without difficulty and the preliminary cut  was made in the femoral neck. At this point, the acetabulum was addressed. Hohmann retractors were  placed circumferentially around the acetabulum and a wing retractor was  placed proximally. The acetabular labrum was removed in its entirety. The fovea centralis was then cleaned. Any bleeding encountered was  controlled once again with the Bovie. A soft tissue release was  performed inferiorly due to the tight capsular structures. Once the acetabulum was circumferentially isolated, a serial reaming was  performed to 1-mm smaller than the aforementioned size. This gave an  excellent fit circumferentially and the trial component was placed. This was positioned in approximately 20 degrees of anteversion and 45  degrees of abduction. I was very happy with the overall bleeding bed. The platelet gel was then placed into this bleeding bed and the  acetabular component was placed. Excellent fit was obtained. The liner  was then locked into position protecting against superior and posterior  dislocation. This was all carefully checked with the tonsil. Attention was then drawn toward the femur once again. The cookie cutter  was utilized to appropriately lateralize the opening. The awls were  used and then the broach was taken of the patient up to the  aforementioned size. A trial reduction was performed and gave excellent  leg length and leg stability.   The patient did not demonstrate any signs  of dislocation with 90 degrees of abduction and 50 degrees of internal  and external rotation as well as full extension and 50 degrees of  internal and external rotation. The permanent femoral component was then placed and gave the same  excellent alignment and stability. Again, platelet gel was utilized on  the prepped surface for the component placement. Everything else was assessed once again after the hip was reduced. There were no signs of impingement. Any redundant osteophyte tissue had  been removed at this point. Overall tissue tension was appropriate. The wound was then closed in layered fashion with No. 2 Ethibond in the  capsule and abductor mechanism. Each layer was sprayed with platelet  gel. The patient's wound was closed completely and the patient was  placed on abduction pillow postoperatively with the leg length and leg  position appearing to be appropriate. The patient was then transported  to the recovery room uneventfully.         Ting Brumfield MD    D: 11/16/2020 9:02:19       T: 11/16/2020 12:01:06     [email or web address]  Job#: [id number]     Doc#: [id number]    CC:

## 2020-11-16 NOTE — H&P
I have reviewed the history and physical and examined the patient and find no relevant changes. I have reviewed with the patient and/or family the risks, benefits, and alternatives to the procedure. Patient being given increased dosage/quantity of opoid pain medication in excess of OSMB guidelines which noted a 30 MED daily of opioids due to the fact that he/she has undergone major orthopaedic surgery as outlined in rule 4731-11-13. Dosages and further duration of the pain medication will be re-evaluated at her post op visit in 2 weeks. Patient was educated on dosing expectations and limits of prescribing as a result of the new [facility] Board rules enacted August 31, 2017. Please also note that this is not the initial opoid prescription issued to this patient but a continuation of medication utilized during the hospital admission as noted in the medical record. OARRS report has also been utilized to screen for any abuse history or suspicious activity as outlined in [place]. All efforts have been taken to prevent abuse potential and misuse of opioid medications including education, screening, and close clinical follow up. Patient being given increased dosage/quantity of opoid pain medication in excess of OSMB guidelines which noted a 30 MED daily of opioids due to the fact that he/she has undergone major orthopaedic surgery as outlined in rule 4731-11-13. Dosages and further duration of the pain medication will be re-evaluated at her post op visit in 2 weeks. Patient was educated on dosing expectations and limits of prescribing as a result of the new [facility] Board rules enacted August 31, 2017. Please also note that this is not the initial opoid prescription issued to this patient but a continuation of medication utilized during the hospital admission as noted in the medical record.  OARRS report has also been utilized to screen for any abuse history or suspicious activity as outlined in [place]. All efforts have been taken to prevent abuse potential and misuse of opioid medications including education, screening, and close clinical follow up. Controlled Substance Monitoring:    Acute and Chronic Pain Monitoring:   RX Monitoring 11/16/2020   Acute Pain Prescriptions -   Periodic Controlled Substance Monitoring No signs of potential drug abuse or diversion identified.

## 2020-11-16 NOTE — BRIEF OP NOTE
Brief Postoperative Note      Patient: Dayami Post  YOB: 1960  MRN: [id number]    Date of Procedure: 11/16/2020    Pre-Op Diagnosis: LEFT HIP OSTEOARTHRITIS    Post-Op Diagnosis: Same       Procedure(s):  LEFT TOTAL HIP REPLACEMENT WITH Sam Zepeda    Surgeon(s):  Julita Bello MD    Assistant:  Surgical Assistant: Sonido Rivera    Anesthesia: General    Estimated Blood Loss (mL): 777     Complications: None    Specimens:   ID Type Source Tests Collected by Time Destination   A :  LEFT  FEMORAL  HEAD Bone Bone SURGICAL PATHOLOGY Julita Bello MD 11/16/2020 6283        Implants:  Implant Name Type Inv. Item Serial No.  Lot No. LRB No. Used Action   COVER H ACET HIP THRD FOR REFLCT SYS R3  COVER H ACET HIP THRD FOR REFLCT SYS R3  [facility] [employer]- [id number] Left 1 Implanted   SCREW BNE L25MM DIA6.5MM CANC HIP SPHR HD FOR ACET SYS  SCREW BNE L25MM DIA6.5MM CANC HIP SPHR HD FOR ACET SYS  [facility] [employer] [id number] Left 1 Implanted   LINER ACET OD50MM ID32MM TAPR REGION [address] 3MM LOAD BEAR  LINER ACET OD50MM ID32MM TAPR REGION THK5. 3MM LOAD BEAR  [facility] [place] [id number] Left 1 Implanted   SHELL ACET [id number] 3 H STD HIP POLY POR PRESSFIT R3  SHELL ACET [id number] 3 H STD HIP POLY POR PRESSFIT R3  [facility] [employer]- [id number] Left 1 Implanted   COVER SCR H ACET R3  COVER SCR H ACET R3  [employer]- [id number] Left 1 Implanted   COVER SCR H ACET R3  COVER SCR H ACET R3  [facility] [employer]- [id number] Left 1 Implanted   HEAD FEM [id number] NK L-3MM 12/14 TAPR HIP OXINIUM MARISSA REV  HEAD FEM [id number] NK L-3MM 12/14 TAPR HIP OXINIUM AMRISSA REV  [facility] [employer]- [id number] Left 1 Implanted   STEM FEM SZ 14 L160MM HIP TI POR STD OFFSET CEMENTLESS FOR  STEM FEM SZ 14 L160MM HIP TI POR STD OFFSET CEMENTLESS FOR  [facility] [employer]- [id number] Left 1 Implanted

## 2020-11-16 NOTE — PROGRESS NOTES
RESPIRATORY THERAPY ASSESSMENT FOR PRN PATIENTS    Name:  Kary [facility] Record Number:  [id number]  Age: 64 y.o. Gender: female  : 1960  Today's Date:  2020  Room:  0536/0536-01  Patient Admission Diagnosis      Allergies  No Known Allergies    Vital Signs   /62   Pulse 76   Temp 97.7 °F (36.5 °C) (Oral)   Resp 18   Ht 5' 3\" (1.6 m)   Wt 177 lb 2 oz (80.3 kg)   SpO2 90%   BMI 31.38 kg/m²     Plan       Goals: Patient does not have any Respiratory Care goals at this time. Comments: Patient assessed and chart reviewed. Plan of Care: Albuterol HHN Q6H PRN. Re-evaluate as needed.     Patient/caregiver was educated on the proper method of use of Respiratory Therapy device:  Yes      Level of patient/caregiver understanding able to:   [] Verbalize understanding   [] Demonstrate understanding       [] Teach back        [] Needs reinforcement       []  No available caregiver               []  Other:     Response to education:  Excellent     Electronically signed by Anju Garcia RCP, RRT, RRT-ACCS on 2020 at 12:38 PM

## 2020-11-16 NOTE — PROGRESS NOTES
Physical Therapy    Facility/Department: [place] [address] - MED SURG/ORTHO  Initial Assessment    NAME: Amilcar Landaverde  : 1960  MRN: [id number]    Date of Service: 2020    Discharge Recommendations:  24 hour supervision or assist, Home with Home health PT   PT Equipment Recommendations  Equipment Needed: No    Assessment   Body structures, Functions, Activity limitations: Decreased functional mobility ; Decreased strength;Decreased balance; Increased pain  Assessment: Pt referred for PT evaluation during current hospital stay s/p scheduled L THR surgery on 20. Pt currently functioning quite well on POD #0, amb short distances in hospital room with min/CGA x 1 with use of walker and performing all supine exercise with (I). Pt appears well-versed in hip precautions and hopes to be able to D/C home tomorrow (POD #1). Anticipate no barriers to pt returning home with family once she can safely navigate stairs. Recommend initial 24-hr sup/assist from family for safety and home PT. Treatment Diagnosis: Decreased ROM/strength LLE and (I) with mobility s/p L THR on 20  Specific instructions for Next Treatment: Progress ther ex and mobility as tolerated  Prognosis: Excellent  Decision Making: Low Complexity  PT Education: Goals; General Safety;Gait Training;PT Role;Disease Specific Education; Functional Mobility Training;Plan of Care;Home Exercise Program;Precautions;Transfer Training;Weight-bearing Education;Equipment  Patient Education: Disease-specific education: Pt educated on compensatory transfer/gait strategies with walker and in 50% WB status for LLE s/p L THR surgery; pt verbalizes/demonstrates understanding. REQUIRES PT FOLLOW UP: Yes  Activity Tolerance: Patient Tolerated treatment well       Patient Diagnosis(es): The encounter diagnosis was Primary osteoarthritis of left hip.     has a past medical history of Arthritis, Asthma, Depression, Hyperlipidemia, and Hypertension.    has a past surgical history that includes Hysterectomy;  section; pr njx dx/ther sbst intrlmnr crv/thrc w/img gdn (Bilateral, 2018); pr njx dx/ther sbst intrlmnr crv/thrc w/img gdn (N/A, 10/16/2018); pr njx dx/ther sbst intrlmnr crv/thrc w/img gdn (Right, 10/30/2018); and Total hip arthroplasty (Right, 2020). Restrictions  Restrictions/Precautions  Restrictions/Precautions: Weight Bearing, ROM Restrictions, Fall Risk, General Precautions, Surgical Protocols  Lower Extremity Weight Bearing Restrictions  Left Lower Extremity Weight Bearing: Partial Weight Bearing  Partial Weight Bearing Percentage Or Pounds: 50%  Position Activity Restriction  Hip Precautions: No ADduction, No hip external rotation, No hip extension  Vision/Hearing  Vision: Within Functional Limits  Hearing: Within functional limits     Subjective  General  Chart Reviewed: Yes  Patient assessed for rehabilitation services?: Yes  Family / Caregiver Present: No  Referring Practitioner: Dr. Mehreen Caballero and LUZ MARIA Bloom  Referral Date : 20  Diagnosis: L hip OA, s/p L anterolateral THR on 20  Follows Commands: Within Functional Limits  General Comment  Comments: Pt resting in bed upon entry of PT  Subjective  Subjective: Pt agreeable to work with PT this afternoon, very pleasant and cooperative. States she'd like to get up and try to walk. Pain Screening  Patient Currently in Pain: Yes  Pain Assessment  Pain Assessment: 0-10  Pain Level: 5  Pain Type: Surgical pain  Pain Location: Hip  Pain Orientation: Left  Pain Descriptors: Aching; Sore  Pain Frequency: Intermittent  Non-Pharmaceutical Pain Intervention(s): Ambulation/Increased Activity;Repositioned;Cold applied; Emotional support  Intervention List: Patient able to continue with treatment    Orientation  Orientation  Overall Orientation Status: Within Normal Limits     Social/Functional History  Social/Functional History  Lives With: Alone  Type of Home: [facility]  Layout: One level  Home Access: Stairs to enter with rails  Entrance Stairs - Number of Steps: 7-8 CHENCHO down into basement level  Entrance Stairs - Rails: Both  Bathroom Shower/Tub: Tub/Shower unit  Bathroom Toilet: Handicap height  Bathroom Equipment: Hand-held shower, Shower chair, Toilet raiser  Home Equipment: Rolling walker, Cane, Standard walker  ADL Assistance: Independent  Homemaking Assistance: Independent(daughter helps prn for cleaning since having increased pain.)  Ambulation Assistance: Independent  Transfer Assistance: Independent  Active : Yes  Occupation: Retired  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 walker, moving from bed>toilet>chair)     Ambulation  WB Status: 50% WB LLE  Surface: level tile  Device: Standard Walker  Assistance: Contact guard assistance;Minimal assistance(CGA x 1 for majority of amb, Kamilah x 1 during one small LOB)  Quality of Gait: Pt amb with slow micha using step-to gait pattern, min cues needed initially for proper sequencing. Pt experienced one small bobble when amb in bathroom due to L knee slightly buckling, largely self-correcting with Kamilah from PT. Gait Deviations: Slow Micha;Decreased step length;Decreased step height  Distance: x 15 feet, x 25 feet    Balance  Posture: Good  Sitting - Static: Good  Sitting - Dynamic: Good  Standing - Static: Good;-  Standing - Dynamic: Fair(using std.  walker)     Exercises  Quad Sets: x 10 BLE  Heelslides: x 10 LLE (I)  Gluteal Sets: x 10 bilat  Knee Short Arc Quad: x 10 LLE (I)  Ankle Pumps: x 15 BLE     Plan   Times per week: BID 7x/week  Times per day: Twice a day  Specific instructions for Next Treatment: Progress ther ex and mobility as tolerated  Current Treatment Recommendations: Strengthening, ROM, Functional Mobility Training, Transfer Training, Gait Training, Stair training, Home Exercise Program, Safety Education & Training, Patient/Caregiver Education & Training, Equipment Evaluation, Education, & procurement  Safety Devices: All fall risk precautions in place, Left in chair, Call light within reach, Chair alarm in place, Nurse notified, Gait belt, Patient at risk for falls    AM-PAC Score  AM-PAC Inpatient Mobility Raw Score : 19 (11/16/20 1758)  AM-PAC Inpatient T-Scale Score : 45.44 (11/16/20 1758)  Mobility Inpatient CMS 0-100% Score: 41.77 (11/16/20 1758)  Mobility Inpatient CMS G-Code Modifier : CK (11/16/20 1758)    Goals  Short term goals  Time Frame for Short term goals: 2 days, 11/18/20 (unless otherwise specified)  Short term goal 1: Pt will transfer supine <-> sit with modified(I)  Short term goal 2: Pt will transfer sit <-> stand and bed>chair using walker with modified(I)  Short term goal 3: Pt will ambulate x 150 feet using walker with supervision  Short term goal 4: Pt will ambulate up/down 6 steps using B handrails with CGA/SBA x 1  Short term goal 5: By 11/17/20: Pt will tolerate 12-15 reps LLE ther ex for ROM/strengthening  Patient Goals   Patient goals : \"To be able to walk around my house (distances of ~50 feet) using my walker without help from my family (SBA)\" by 11/17/20       Therapy Time   Individual Concurrent Group Co-treatment   Time In 1430         Time Out 1505         Minutes 35         Timed Code Treatment Minutes: [address] [facility], [address], [place] #[id number]    If pt is unable to be seen after this session, please let this note serve as discharge summary.   Please see case management note for discharge disposition. Thank you.

## 2020-11-17 VITALS
BODY MASS INDEX: 31.38 KG/M2 | HEIGHT: 63 IN | RESPIRATION RATE: 16 BRPM | WEIGHT: 177.13 LBS | HEART RATE: 76 BPM | SYSTOLIC BLOOD PRESSURE: 103 MMHG | OXYGEN SATURATION: 95 % | TEMPERATURE: 97.8 F | DIASTOLIC BLOOD PRESSURE: 56 MMHG

## 2020-11-17 LAB
ANION GAP SERPL CALCULATED.3IONS-SCNC: 6 MMOL/L (ref 3–16)
BUN BLDV-MCNC: 14 MG/DL (ref 7–20)
CALCIUM SERPL-MCNC: 8.8 MG/DL (ref 8.3–10.6)
CHLORIDE BLD-SCNC: 95 MMOL/L (ref 99–110)
CO2: 31 MMOL/L (ref 21–32)
CREAT SERPL-MCNC: 0.8 MG/DL (ref 0.6–1.1)
GFR AFRICAN AMERICAN: >60
GFR NON-AFRICAN AMERICAN: >60
GLUCOSE BLD-MCNC: 125 MG/DL (ref 70–99)
HCT VFR BLD CALC: 35.2 % (ref 36–48)
HEMOGLOBIN: 11.4 G/DL (ref 12–16)
MAGNESIUM: 2.2 MG/DL (ref 1.8–2.4)
MCH RBC QN AUTO: 27.3 PG (ref 26–34)
MCHC RBC AUTO-ENTMCNC: 32.3 G/DL (ref 31–36)
MCV RBC AUTO: 84.4 FL (ref 80–100)
PDW BLD-RTO: 17.9 % (ref 12.4–15.4)
PLATELET # BLD: 303 K/UL (ref 135–450)
PMV BLD AUTO: 7.6 FL (ref 5–10.5)
POTASSIUM REFLEX MAGNESIUM: 3.4 MMOL/L (ref 3.5–5.1)
RBC # BLD: 4.16 M/UL (ref 4–5.2)
SODIUM BLD-SCNC: 132 MMOL/L (ref 136–145)
WBC # BLD: 10.6 K/UL (ref 4–11)

## 2020-11-17 PROCEDURE — 97110 THERAPEUTIC EXERCISES: CPT

## 2020-11-17 PROCEDURE — 83735 ASSAY OF MAGNESIUM: CPT

## 2020-11-17 PROCEDURE — 80048 BASIC METABOLIC PNL TOTAL CA: CPT

## 2020-11-17 PROCEDURE — 97530 THERAPEUTIC ACTIVITIES: CPT

## 2020-11-17 PROCEDURE — APPNB30 APP NON BILLABLE TIME 0-30 MINS: Performed by: PHYSICIAN ASSISTANT

## 2020-11-17 PROCEDURE — 6370000000 HC RX 637 (ALT 250 FOR IP): Performed by: INTERNAL MEDICINE

## 2020-11-17 PROCEDURE — 97116 GAIT TRAINING THERAPY: CPT

## 2020-11-17 PROCEDURE — G0378 HOSPITAL OBSERVATION PER HR: HCPCS

## 2020-11-17 PROCEDURE — 2580000003 HC RX 258: Performed by: PHYSICIAN ASSISTANT

## 2020-11-17 PROCEDURE — 6360000002 HC RX W HCPCS: Performed by: PHYSICIAN ASSISTANT

## 2020-11-17 PROCEDURE — 6370000000 HC RX 637 (ALT 250 FOR IP): Performed by: PHYSICIAN ASSISTANT

## 2020-11-17 PROCEDURE — 36415 COLL VENOUS BLD VENIPUNCTURE: CPT

## 2020-11-17 PROCEDURE — 97535 SELF CARE MNGMENT TRAINING: CPT

## 2020-11-17 PROCEDURE — 85027 COMPLETE CBC AUTOMATED: CPT

## 2020-11-17 RX ORDER — OXYCODONE HYDROCHLORIDE 5 MG/1
5-10 TABLET ORAL EVERY 4 HOURS PRN
Qty: 40 TABLET | Refills: 0 | Status: SHIPPED | OUTPATIENT
Start: 2020-11-17 | End: 2020-11-24 | Stop reason: SDUPTHER

## 2020-11-17 RX ORDER — ACETAMINOPHEN 325 MG/1
650 TABLET ORAL EVERY 6 HOURS PRN
Qty: 120 TABLET | Refills: 0
Start: 2020-11-17

## 2020-11-17 RX ADMIN — GABAPENTIN 300 MG: 300 CAPSULE ORAL at 10:40

## 2020-11-17 RX ADMIN — APIXABAN 2.5 MG: 2.5 TABLET, FILM COATED ORAL at 10:40

## 2020-11-17 RX ADMIN — FLUOXETINE 20 MG: 20 CAPSULE ORAL at 10:40

## 2020-11-17 RX ADMIN — OXYCODONE HYDROCHLORIDE 10 MG: 5 TABLET ORAL at 15:39

## 2020-11-17 RX ADMIN — DOCUSATE SODIUM 50MG AND SENNOSIDES 8.6MG 1 TABLET: 8.6; 5 TABLET, FILM COATED ORAL at 10:40

## 2020-11-17 RX ADMIN — ACETAMINOPHEN 650 MG: 325 TABLET ORAL at 14:08

## 2020-11-17 RX ADMIN — CEFAZOLIN SODIUM 2 G: 10 INJECTION, POWDER, FOR SOLUTION INTRAVENOUS at 01:01

## 2020-11-17 RX ADMIN — OXYCODONE HYDROCHLORIDE 10 MG: 5 TABLET ORAL at 10:40

## 2020-11-17 RX ADMIN — ACETAMINOPHEN 650 MG: 325 TABLET ORAL at 10:40

## 2020-11-17 RX ADMIN — POTASSIUM CHLORIDE 20 MEQ: 20 TABLET, EXTENDED RELEASE ORAL at 10:40

## 2020-11-17 RX ADMIN — CELECOXIB 100 MG: 100 CAPSULE ORAL at 10:40

## 2020-11-17 RX ADMIN — Medication 10 ML: at 10:42

## 2020-11-17 RX ADMIN — GABAPENTIN 300 MG: 300 CAPSULE ORAL at 14:08

## 2020-11-17 RX ADMIN — OXYCODONE HYDROCHLORIDE 10 MG: 5 TABLET ORAL at 05:52

## 2020-11-17 RX ADMIN — HYDROCHLOROTHIAZIDE 12.5 MG: 25 TABLET ORAL at 10:41

## 2020-11-17 ASSESSMENT — PAIN SCALES - WONG BAKER: WONGBAKER_NUMERICALRESPONSE: 4

## 2020-11-17 ASSESSMENT — PAIN SCALES - GENERAL
PAINLEVEL_OUTOF10: 7

## 2020-11-17 ASSESSMENT — PAIN DESCRIPTION - LOCATION
LOCATION: HIP
LOCATION: HIP

## 2020-11-17 ASSESSMENT — PAIN DESCRIPTION - ORIENTATION
ORIENTATION: LEFT
ORIENTATION: LEFT

## 2020-11-17 ASSESSMENT — PAIN DESCRIPTION - FREQUENCY: FREQUENCY: CONTINUOUS

## 2020-11-17 ASSESSMENT — PAIN DESCRIPTION - DESCRIPTORS: DESCRIPTORS: ACHING;SORE

## 2020-11-17 NOTE — PROGRESS NOTES
Physical Therapy  Facility/Department: [place] [address] - MED SURG/ORTHO  Daily Treatment Note/Discharge Summary  NAME: Gary Arceo  : 1960  MRN: [id number]    Date of Service: 2020    Discharge Recommendations:  24 hour supervision or assist, Home with Home health PT   PT Equipment Recommendations  Equipment Needed: No    Assessment   Body structures, Functions, Activity limitations: Decreased functional mobility ; Decreased strength;Decreased balance; Increased pain  Assessment: Pt participated well with PT today, despite increase in L hip pain. Pt remains (I) with all supine exercises and demonstrates (I) or near-(I) with functional mobility -- including stair amb. Pt appears safe to D/C home today from a PT standpoint with no further acute PT services needed; will sign off. Treatment Diagnosis: Decreased ROM/strength LLE and (I) with mobility s/p L THR on 20  Prognosis: Excellent  Decision Making: Low Complexity  PT Education: Goals; General Safety;Gait Training;PT Role;Disease Specific Education; Functional Mobility Training;Plan of Care;Home Exercise Program;Precautions;Transfer Training;Weight-bearing Education;Equipment  Patient Education: Disease-specific education: Pt educated on compensatory transfer/gait strategies with walker and in 50% WB status for LLE s/p L THR surgery; pt verbalizes/demonstrates understanding. No Skilled PT: Safe to return home  REQUIRES PT FOLLOW UP: No  Activity Tolerance: Patient Tolerated treatment well     Patient Diagnosis(es): The encounter diagnosis was Primary osteoarthritis of left hip.     has a past medical history of Arthritis, Asthma, Depression, Hyperlipidemia, and Hypertension. has a past surgical history that includes Hysterectomy;   section; pr njx dx/ther sbst intrlmnr crv/thrc w/img gdn (Bilateral, 2018); pr njx dx/ther sbst intrlmnr crv/thrc w/img gdn (N/A, 10/16/2018); pr njx dx/ther sbst intrlmnr crv/thrc w/img gdn (Right, 10/30/2018); Total hip arthroplasty (Right, 7/29/2020); and Total hip arthroplasty (Left, 11/16/2020). Restrictions  Restrictions/Precautions  Restrictions/Precautions: Weight Bearing, ROM Restrictions, Fall Risk, General Precautions, Surgical Protocols  Lower Extremity Weight Bearing Restrictions  Left Lower Extremity Weight Bearing: Partial Weight Bearing  Partial Weight Bearing Percentage Or Pounds: 50%  Position Activity Restriction  Hip Precautions: No ADduction, No hip external rotation, No hip extension     Subjective   General  Chart Reviewed: Yes  Response To Previous Treatment: Patient with no complaints from previous session. Family / Caregiver Present: No  Referring Practitioner: Dr. Dandy Troncoso and LUZ MARIA Baker  Subjective  Subjective: Pt agreeable to work with PT this morning, very pleasant and cooperative. States she's having more pain/soreness in L hip today compared to yesterday. General Comment  Comments: Pt resting in bed upon entry of PT  Pain Screening  Patient Currently in Pain: Yes  Pain Assessment  Pain Assessment: 0-10  Pain Level: 7  Pain Type: Surgical pain  Pain Location: Hip  Pain Orientation: Left  Pain Descriptors: Aching; Sore  Pain Frequency: Continuous  Non-Pharmaceutical Pain Intervention(s): Ambulation/Increased Activity;Repositioned;Cold applied; Emotional support    Orientation  Orientation  Overall Orientation Status: Within Normal Limits    Objective   Bed mobility  Supine to Sit: Modified independent(moving toward R side, HOB elevated ~45 degrees)  Scooting: Independent(to scoot forward to EOB)     Transfers  Sit to Stand: Modified independent  Stand to sit: Modified independent  Bed to Chair: Modified independent(using std.  walker, moving from bed>w/c>chair)     Ambulation  WB Status: 50% WB LLE  Surface: level tile  Device: Standard Walker  Assistance: Supervision  Quality of Gait: Pt amb with slight increase in micha now compared to PT session yesterday, using step-to gait pattern. No cues needed for proper sequencing. No episodes of LOB noted with amb. Good L hip/knee control while amb. Gait Deviations: Slow Micha;Decreased step length  Distance: x 200 feet, x 25 feet    Stairs/Curb  # Steps : 3  Stairs Height: 6\"  Rails: Bilateral  Device: No Device  Assistance: Contact guard assistance  Comment: Pt amb with steady micha using step-to gait pattern. L hip/knee appear steady with WB'ing; no joint instability or buckling. Balance  Posture: Good  Sitting - Static: Good  Sitting - Dynamic: Good  Standing - Static: Good;-  Standing - Dynamic: Good;-(using std. walker)     Exercises  Quad Sets: x 15 BLE  Heelslides: x 15 LLE (I)  Gluteal Sets: x 15 bilat  Knee Short Arc Quad: x 15 LLE (I)  Ankle Pumps: x 15 BLE   Comments: Pt declines need for hard copy of HEP, states she already has one from previous THR surgery several months ago. AM-PAC Score  AM-PAC Inpatient Mobility Raw Score : 22 (11/17/20 1118)  AM-PAC Inpatient T-Scale Score : 53.28 (11/17/20 1118)  Mobility Inpatient CMS 0-100% Score: 20.91 (11/17/20 1118)  Mobility Inpatient CMS G-Code Modifier : CJ (11/17/20 1118)    Goals  Short term goals  Time Frame for Short term goals: 2 days, 11/18/20 (unless otherwise specified)  Short term goal 1: Pt will transfer supine <-> sit with modified(I) - MET 11/17/20  Short term goal 2: Pt will transfer sit <-> stand and bed>chair using walker with modified(I) - MET 11/17/20  Short term goal 3: Pt will ambulate x 150 feet using walker with supervision - MET 11/17/20  Short term goal 4: Pt will ambulate up/down 3 steps using B handrails with CGA/SBA x 1 - MET 11/17/20  Short term goal 5: By 11/17/20: Pt will tolerate 12-15 reps LLE ther ex for ROM/strengthening - MET 11/17/20  Patient Goals   Patient goals :  \"To be able to walk around my house (distances of ~50 feet) using my walker without help from my family (SBA)\" by 11/17/20 - MET 11/17/20    Plan    Times per week: D/C from acute PT services  Times per day: Twice a day  Specific instructions for Next Treatment: Progress ther ex and mobility as tolerated  Current Treatment Recommendations: Strengthening, ROM, Functional Mobility Training, Transfer Training, Gait Training, Stair training, Home Exercise Program, Safety Education & Training, Patient/Caregiver Education & Training, Equipment Evaluation, Education, & procurement  Safety Devices:  All fall risk precautions in place, Left in chair, Call light within reach, Chair alarm in place, Nurse notified, Gait belt, Patient at risk for falls     Therapy Time   Individual Concurrent Group Co-treatment   Time In 0957         Time Out 1040         Minutes 43         Timed Code Treatment Minutes: [place] [id number], [place], [place] #[id number]

## 2020-11-17 NOTE — PROGRESS NOTES
Pt assessment completed and charted. VSS. Pt a/o. Pt reports pain 7/10, PRN medication being administered. Bed in lowest position and wheels locked. Call light within reach. Bedside table within reach. Non-skid footwear in place. Pt denies any other needs at this time. Pt calls out appropriately. Will continue to monitor.

## 2020-11-17 NOTE — PROGRESS NOTES
Hospitalist Progress Note      PCP: Lay Quick DO    Date of Admission: 11/16/2020    Chief Complaint: S/p left total hip replacement    Hospital Course:     Subjective: No complaint of pain, chest pain shortness of breath or change in mental status. She is cooperating with rehabilitation. Medications:  Reviewed    Infusion Medications    sodium chloride 75 mL/hr at 11/16/20 1330     Scheduled Medications    celecoxib  100 mg Oral BID    gabapentin  300 mg Oral TID    atorvastatin  20 mg Oral Nightly    cetirizine  10 mg Oral Nightly    FLUoxetine  20 mg Oral Daily    hydroCHLOROthiazide  12.5 mg Oral Daily    potassium chloride  20 mEq Oral Daily    sodium chloride flush  10 mL Intravenous 2 times per day    acetaminophen  650 mg Oral Q6H    sennosides-docusate sodium  1 tablet Oral BID    apixaban  2.5 mg Oral BID     PRN Meds: albuterol, sodium chloride flush, morphine **OR** morphine, oxyCODONE **OR** oxyCODONE, polyethylene glycol, promethazine **OR** ondansetron      Intake/Output Summary (Last 24 hours) at 11/17/2020 0844  Last data filed at 11/16/2020 1023  Gross per 24 hour   Intake 700 ml   Output 200 ml   Net 500 ml       Physical Exam Performed:    /72   Pulse 69   Temp 97.7 °F (36.5 °C) (Oral)   Resp 18   Ht 5' 3\" (1.6 m)   Wt 177 lb 2 oz (80.3 kg)   SpO2 91%   BMI 31.38 kg/m²     General appearance: No apparent distress, appears stated age and cooperative. Obese  HEENT: Pupils equal, round, and reactive to light. Conjunctivae/corneas clear. Neck: Supple, with full range of motion. No jugular venous distention. Trachea midline. Respiratory:  Normal respiratory effort. Clear to auscultation, bilaterally without Rales/Wheezes/Rhonchi. Cardiovascular: Regular rate and rhythm with normal S1/S2 without murmurs, rubs or gallops. Abdomen: Soft, non-tender, non-distended with normal bowel sounds. Musculoskeletal: No clubbing, cyanosis or edema bilaterally.   Left hip replacement   skin: Skin color, texture, turgor normal.  No rashes or lesions. Neurologic:  Neurovascularly intact without any focal sensory/motor deficits. Cranial nerves: II-XII intact, grossly non-focal.  Psychiatric: Alert and oriented, thought content appropriate, normal insight  Capillary Refill: Brisk,< 3 seconds   Peripheral Pulses: +2 palpable, equal bilaterally       Labs:   Recent Labs     11/17/20  0725   WBC 10.6   HGB 11.4*   HCT 35.2*        Recent Labs     11/16/20  0619 11/17/20  0726    132*   K 3.4* 3.4*   CL 96* 95*   CO2 29 31   BUN 17 14   CREATININE 0.7 0.8   CALCIUM 9.9 8.8     No results for input(s): AST, ALT, BILIDIR, BILITOT, ALKPHOS in the last 72 hours. No results for input(s): INR in the last 72 hours. No results for input(s): Geofm Squibb in the last 72 hours. Urinalysis:      Lab Results   Component Value Date    NITRU Negative 11/02/2020    WBCUA 0-2 07/14/2020    BACTERIA 2+ 07/14/2020    RBCUA None seen 07/14/2020    BLOODU Negative 11/02/2020    SPECGRAV 1.025 11/02/2020    GLUCOSEU Negative 11/02/2020       Radiology:  XR HIP LEFT (1 VIEW)   Final Result   Normal/expected postsurgical appearance. No evidence of complication.                  Assessment/Plan:    Active Hospital Problems    Diagnosis    Status post total replacement of left hip [Z96.642]    Status post total hip replacement, left [Z96.642]     Osteoarthritis s/p left hip replacement-pain control, monitor vitals, labs in the morning rehab and weightbearing defer to primary team  Vitals labs reviewed     Hypokalemia-replace and monitor, started on routine potassium     Hypertension-hydrochlorothiazide and routine potassium blood pressure is stable     Hyperlipidemia-Lipitor     Depression-Prozac    DVT Prophylaxis: Eliquis  Diet: DIET LOW SODIUM 2 GM;  Code Status: Full Code    PT/OT Eval Status: Ongoing    Dispo -per primary team    Apple Rojas MD

## 2020-11-17 NOTE — PROGRESS NOTES
Occupational Therapy  Facility/Department: [facility] - MED SURG/ORTHO  Daily Treatment Note/Discharge Summary  NAME: Maury Honeycutt  : 1960  MRN: [id number]    Date of Service: 2020    Discharge Recommendations:  Home with assist PRN     Assessment   Assessment: Pt demos good progress toward OT goals, has met all acute OT goals at this time. Per discussion with pt, and Angie Santana OT, no further acute OT needs identified, will d/c pt from OT services. Prognosis: Good  OT Education: OT Role;Plan of Care;Precautions; ADL Adaptive Strategies;Transfer Training;IADL Safety;Equipment  Patient Education: Disease specific: ADL safety/transfers, LE dressing  REQUIRES OT FOLLOW UP: No  Activity Tolerance  Activity Tolerance: Patient Tolerated treatment well  Safety Devices  Safety Devices in place: Yes  Type of devices: Left in chair;Call light within reach;Nurse notified; Chair alarm in place;Gait belt         Patient Diagnosis(es): The encounter diagnosis was Primary osteoarthritis of left hip.      has a past medical history of Arthritis, Asthma, Depression, Hyperlipidemia, and Hypertension. has a past surgical history that includes Hysterectomy;  section; pr njx dx/ther sbst intrlmnr crv/thrc w/img gdn (Bilateral, 2018); pr njx dx/ther sbst intrlmnr crv/thrc w/img gdn (N/A, 10/16/2018); pr njx dx/ther sbst intrlmnr crv/thrc w/img gdn (Right, 10/30/2018); Total hip arthroplasty (Right, 2020); and Total hip arthroplasty (Left, 2020).     Restrictions  Restrictions/Precautions  Restrictions/Precautions: Weight Bearing, ROM Restrictions, Fall Risk, General Precautions, Surgical Protocols  Lower Extremity Weight Bearing Restrictions  Left Lower Extremity Weight Bearing: Partial Weight Bearing  Partial Weight Bearing Percentage Or Pounds: 50%  Position Activity Restriction  Hip Precautions: No ADduction, No hip external rotation, No hip extension     Subjective   General  Chart goals  Time Frame for Short term goals: 1 week unless otherwise specified 11/19  Short term goal 1: Pt will verbalize understanding of safe car transfers after education by 11/17-- GOAL MET, pt verbalizes understanding 11/17/20  Short term goal 2: Pt will complete LE dressing with SBA-- GOAL MET, pt abad mod I 11/17/20  Short term goal 3: Pt will complete toilet transfers with SBA-- GOAL MET pt abad OGLESBY 11/17/20  Patient Goals   Patient goals : Dominique Drown be able to get my underwear on independently before I leave\"-- GOAL MET pt mod I 11/17/20       Therapy Time   Individual Concurrent Group Co-treatment   Time In 1131         Time Out 1154         Minutes 1005 [address], [place]/L

## 2020-11-17 NOTE — CARE COORDINATION
DCP met with Severo LOERA Re: d/c planning. Pt has all DME. Met with pt at bedside to discuss [facility] pt is in agreement with no agency preference. OK for referral to [address] [place] [address],[address], spoke to Micheline Andrea. No further needs identified by DCP. Please contact should further needs arise.  Ciro Fonseca RN

## 2020-11-17 NOTE — PROGRESS NOTES
Department of Orthopedic Surgery  Physician Assistant   Progress Note    Subjective:     Post-Operative Day: 1 Status Post left Total Hip Arthroplasty  Systemic or Specific Complaints:No Complaints this am.  Feeling well and able to participate with therapy this morning. Pain controlled. Objective:     Patient Vitals for the past 24 hrs:   BP Temp Temp src Pulse Resp SpO2   11/17/20 0105 124/72 97.7 °F (36.5 °C) Oral 69 18 91 %   11/16/20 2113 126/73 97.8 °F (36.6 °C) Oral 70 18 96 %   11/16/20 1541 (!) 105/45 98 °F (36.7 °C) Oral 81 18 93 %   11/16/20 1536 (!) 105/45 98 °F (36.7 °C) Oral -- 18 --   11/16/20 1500 134/77 -- -- -- -- --   11/16/20 1342 106/75 -- -- -- -- --       General: alert, appears stated age, cooperative and no distress   Wound: Wound clean and dry no evidence of infection. Motion: Painful range of Motion   DVT Exam: No evidence of DVT seen on physical exam.       NVI in lower extremity. Thigh swollen but soft. Moving foot and ankle. Data Review  CBC:   Lab Results   Component Value Date    WBC 10.6 11/17/2020    RBC 4.16 11/17/2020    HGB 11.4 11/17/2020    HCT 35.2 11/17/2020     11/17/2020       Assessment:     Status Post left Total Hip Arthroplasty. Doing well postoperatively. Plan:      1: Continues current post-op course, IM following. Labs reviewed and stable post op. Pt recovered well after right THR in July. 2:  Continue Deep venous thrombosis prophylaxis- Eliquis  3:  Continue physical therapy and OT, 50 %WB  4:  Continue Pain Control PRN  5:  D/c planning for home with resumption of [facility]. Pt has DME. DCP updated  6:  Prescriptions have been filled through the Meds to [facility] and Outpatient Pharmacy.     Mary Ellen Smith PA-C

## 2020-11-17 NOTE — DISCHARGE SUMMARY
clean, dry and intact with no signs of infection. The patient remained neurovascularly intact in the lower extremity and had intact pulses distally. Patients calf remained soft and showed no evidence of DVT. The patient was able to move their leg and ankle/foot without any problems post-operatively. Physical therapy and occupational therapy were consulted and began working with the patient post-operatively. The patient progressed with PT/OT as would be expected and continued to improve through their stay. The patients pain was initially controlled with IV medications but we were able to transition to oral pain medications soon after arrival to the floor and their pain remained under good control through their hospital stay. From a medical standpoint the patient remained stable and continued to have the medicine team follow throughout their stay. The patients dressing was changed/incision was checked on day of d/c. The patient will be discharged at this time to Home  with their current diet restrictions and will continue to follow the hip precautions outlined to them by us and PT/OT. Condition on Discharge: Stable    Plan  Return visit in 2 weeks. .  Patient was instructed on the use of pain medications, the signs and symptoms of infection, and was given our number to call should they have any questions or concerns following discharge. For opioid prescriptions given at discharge the following statement is provided for compliance with [employer]SM[employer] rules. Patient being given increased dosage/quantity of opoid pain medication in excess of OSMB guidelines which noted a 30 MED daily of opioids due to the fact that he/she has undergone major orthopaedic surgery as outlined in rule 4731-11-13. Dosages and further duration of the pain medication will be re-evaluated at her post op visit in 2 weeks.   Patient was educated on dosing expectations and limits of prescribing as a result of the new [employer] rules enacted August 31, 2017. Please also note that this is not the initial opoid prescription issued to this patient but a continuation of medication utilized during the hospital admission as noted in the medical record. [place]ARRS report has also been utilized to screen for any abuse history or suspicious activity as outlined in [place]. All efforts have been taken to prevent abuse potential and misuse of opioid medications including education, screening, and close clinical follow up.

## 2020-11-17 NOTE — CARE COORDINATION
[facility]    DC order noted, all docs needed have been faxed to [phone] [facility] for home care services.     Home care to see patient within 24-48 hrs    Tara Lowry RN, BSN CTN  [address] [facility] [phone]

## 2020-11-18 ENCOUNTER — TELEPHONE (OUTPATIENT)
Dept: ORTHOPEDIC SURGERY | Age: 60
End: 2020-11-18

## 2020-11-18 NOTE — TELEPHONE ENCOUNTER
Spoke with pt and daughter, pt is doing well. Incision status: No drainage or redness    Edema/Swelling/Teds: A little swollen today. Pain level and status: Doing ok, very sore. Use of pain medications: Using as needed. Blood thinner: Elquis- no issues. Bowels: Taking a stool softener. Home Care Agency active: [id number] [address] Santana is coming today. RN later today. Outpatient therapy: NA    Do you have all of your medications: Yes    Changes in medications: no    Ortho Vitals: NA  Signed off from pt. Instructed pt to call RN or [place] office with any issues or concerns.     Follow up appointments:    Future Appointments   Date Time Provider Yovanny Davidson   11/30/2020 12:30 PM Jonna Guaman MD AND [facility]

## 2020-11-20 ENCOUNTER — TELEPHONE (OUTPATIENT)
Dept: ORTHOPEDIC SURGERY | Age: 60
End: 2020-11-20

## 2020-11-20 RX ORDER — CYCLOBENZAPRINE HCL 5 MG
5 TABLET ORAL 2 TIMES DAILY PRN
Qty: 10 TABLET | Refills: 0 | Status: SHIPPED | OUTPATIENT
Start: 2020-11-20 | End: 2020-11-24 | Stop reason: SDUPTHER

## 2020-11-24 RX ORDER — CYCLOBENZAPRINE HCL 5 MG
5 TABLET ORAL 2 TIMES DAILY PRN
Qty: 10 TABLET | Refills: 0 | Status: SHIPPED | OUTPATIENT
Start: 2020-11-24 | End: 2020-12-05 | Stop reason: SDUPTHER

## 2020-11-24 RX ORDER — OXYCODONE HYDROCHLORIDE 5 MG/1
5-10 TABLET ORAL EVERY 4 HOURS PRN
Qty: 40 TABLET | Refills: 0 | Status: SHIPPED | OUTPATIENT
Start: 2020-11-24 | End: 2020-12-05 | Stop reason: SDUPTHER

## 2020-11-25 ENCOUNTER — TELEPHONE (OUTPATIENT)
Dept: ORTHOPEDIC SURGERY | Age: 60
End: 2020-11-25

## 2020-11-30 ENCOUNTER — TELEPHONE (OUTPATIENT)
Dept: ORTHOPEDIC SURGERY | Age: 60
End: 2020-11-30

## 2020-11-30 NOTE — TELEPHONE ENCOUNTER
General Question     Subject: DAUGHTER CALL TO CANCEL 1ST POST OP APPOINTMENT  Patient and /or Facility Request: Janny Rodriguez  Contact Number: [phone]

## 2020-12-05 RX ORDER — OXYCODONE HYDROCHLORIDE 5 MG/1
5 TABLET ORAL EVERY 4 HOURS PRN
Qty: 40 TABLET | Refills: 0 | Status: SHIPPED | OUTPATIENT
Start: 2020-12-05 | End: 2020-12-14 | Stop reason: SDUPTHER

## 2020-12-05 RX ORDER — CYCLOBENZAPRINE HCL 5 MG
5 TABLET ORAL 2 TIMES DAILY PRN
Qty: 10 TABLET | Refills: 0 | Status: SHIPPED | OUTPATIENT
Start: 2020-12-05 | End: 2020-12-15

## 2020-12-09 ENCOUNTER — OFFICE VISIT (OUTPATIENT)
Dept: ORTHOPEDIC SURGERY | Age: 60
End: 2020-12-09

## 2020-12-09 VITALS — HEIGHT: 63 IN | BODY MASS INDEX: 31.36 KG/M2 | WEIGHT: 177 LBS

## 2020-12-09 PROCEDURE — 99024 POSTOP FOLLOW-UP VISIT: CPT | Performed by: PHYSICIAN ASSISTANT

## 2020-12-09 NOTE — PROGRESS NOTES
Chief Complaint   Patient presents with    Post-Op Check     PO LEFT THR SX:11/16/2020     History of present illness: The patient returns today after left total hip replacement performed on 11/16/2020. Pain control as been satisfactory with oral medications. There have been no fevers or chills. She is ambulating with a walker with 50% weightbearing. She states she has been having some pain along the thigh, shin and dorsal aspect of foot. She is taking a muscle relaxer at nighttime it does help. Physical examination: The incision is clean, dry, and intact with no drainage or signs of infection. There is expected swelling with no evidence of DVT and a negative Homans sign. Neurovascular exam is intact. Leg length is appropriate. Radiographs: 2 view X-rays taken today including AP pelvis and lateral views of the left hip show excellent alignment of the prosthesis. No evidence of septic or aseptic loosening or periprosthetic fractures. Assessment/plan: We would like to begin outpatient physical therapy to restore range of motion and strength. I did include left-sided sciatica and her physical therapy order. I think that this is contributing to the pain she is having. I would like to try to avoid using a Medrol Dosepak given that she is so fresh out from surgery. We will attempt some physical therapy. The patient was given local wound care instructions. We discussed antibiotic prophylaxis prior to dental procedures for 1 year postoperatively. They will followup in 3-4 weeks for reevaluation. Barbie Landers, [facility]    This dictation was performed with a verbal recognition program [facility] ) and it was checked for errors. It is possible that there are still dictated errors within this office note. If so, please bring any errors to my attention for an addendum. All efforts were made to ensure that this office note is accurate.

## 2020-12-14 RX ORDER — OXYCODONE HYDROCHLORIDE 5 MG/1
5 TABLET ORAL EVERY 6 HOURS PRN
Qty: 28 TABLET | Refills: 0 | Status: SHIPPED | OUTPATIENT
Start: 2020-12-14 | End: 2020-12-23 | Stop reason: SDUPTHER

## 2020-12-14 NOTE — TELEPHONE ENCOUNTER
General Question     Subject: PRESCRIPTION  Patient and /or Facility Request: Yared Guerrero  Contact Number: [phone]

## 2020-12-23 RX ORDER — OXYCODONE HYDROCHLORIDE 5 MG/1
5 TABLET ORAL EVERY 6 HOURS PRN
Qty: 28 TABLET | Refills: 0 | Status: SHIPPED | OUTPATIENT
Start: 2020-12-23 | End: 2020-12-30 | Stop reason: SDUPTHER

## 2020-12-30 ENCOUNTER — TELEPHONE (OUTPATIENT)
Dept: ORTHOPEDIC SURGERY | Age: 60
End: 2020-12-30

## 2020-12-30 RX ORDER — OXYCODONE HYDROCHLORIDE 5 MG/1
5 TABLET ORAL EVERY 8 HOURS PRN
Qty: 21 TABLET | Refills: 0 | Status: SHIPPED | OUTPATIENT
Start: 2020-12-30 | End: 2021-01-06 | Stop reason: SDUPTHER

## 2021-01-06 ENCOUNTER — OFFICE VISIT (OUTPATIENT)
Dept: ORTHOPEDIC SURGERY | Age: 61
End: 2021-01-06

## 2021-01-06 VITALS — HEIGHT: 63 IN | WEIGHT: 177 LBS | BODY MASS INDEX: 31.36 KG/M2

## 2021-01-06 DIAGNOSIS — Z96.642 STATUS POST TOTAL HIP REPLACEMENT, LEFT: Primary | ICD-10-CM

## 2021-01-06 PROCEDURE — 99024 POSTOP FOLLOW-UP VISIT: CPT | Performed by: PHYSICIAN ASSISTANT

## 2021-01-06 RX ORDER — DICLOFENAC SODIUM 75 MG/1
75 TABLET, DELAYED RELEASE ORAL 2 TIMES DAILY WITH MEALS
Qty: 60 TABLET | Refills: 0 | Status: SHIPPED | OUTPATIENT
Start: 2021-01-06

## 2021-01-06 RX ORDER — OXYCODONE HYDROCHLORIDE 5 MG/1
5 TABLET ORAL EVERY 8 HOURS PRN
Qty: 21 TABLET | Refills: 0 | Status: SHIPPED | OUTPATIENT
Start: 2021-01-06 | End: 2021-01-12

## 2021-01-06 NOTE — PROGRESS NOTES
Chief Complaint   Patient presents with    Post-Op Check     PO LEFT THR SX:11/16/2020     History of present illness: The patient returns today after left total hip replacement performed on 11/16/2020. Pain control as been satisfactory with oral medications. She has been taking her pain medication 1 every 8 hours and is nervous about discontinuing this medication as she has been taking it for quite some time now given her bilateral hip replacements. She has been doing very well with physical therapy with regards to the hip. She states she has a lot of pain and soreness in both shoulders likely from using a walker. Physical examination: The incision is well-healed with no drainage or signs of infection. There is expected swelling with no evidence of DVT and a negative Homans sign. Neurovascular exam is intact. Leg length is appropriate. Assessment/plan: We would like to continue outpatient physical therapy to restore range of motion and strength. The patient was given local wound care instructions. We discussed antibiotic prophylaxis prior to dental procedures for 1 year postoperatively. They will followup in 3-4 weeks for reevaluation. She was given some rotator cuff strengthening exercises and prescribed Voltaren 75 mg for her shoulders. If her shoulder pain does not improve with this treatment, she will return for a designated visit for the shoulders with x-rays    She was given a refill of her pain medication today. She is going to wean down off of this medication over the next 1 to 2 weeks. We discussed a plan with transitioning to 1 tablet every 12 hours and then 1/2 tablet every 12 hours and then 1 tablet daily and then discontinuing completely. She should be able to complete this wean down with the prescription that was sent to her pharmacy today. Denny Yanez, [facility]    This dictation was performed with a verbal recognition program [facility] ) and it was checked for errors.  It is possible that there are still dictated errors within this office note. If so, please bring any errors to my attention for an addendum. All efforts were made to ensure that this office note is accurate. Genet Claros, [facility]    This dictation was performed with a verbal recognition program [facility] ) and it was checked for errors. It is possible that there are still dictated errors within this office note. If so, please bring any errors to my attention for an addendum. All efforts were made to ensure that this office note is accurate.

## 2021-01-12 DIAGNOSIS — Z96.642 STATUS POST TOTAL HIP REPLACEMENT, LEFT: ICD-10-CM

## 2021-01-12 RX ORDER — OXYCODONE HYDROCHLORIDE 5 MG/1
5 TABLET ORAL EVERY 8 HOURS PRN
Qty: 21 TABLET | Refills: 0 | Status: SHIPPED | OUTPATIENT
Start: 2021-01-12 | End: 2021-01-19

## 2021-01-12 RX ORDER — OXYCODONE HYDROCHLORIDE 5 MG/1
5 TABLET ORAL EVERY 8 HOURS PRN
Qty: 21 TABLET | Refills: 0 | Status: CANCELLED | OUTPATIENT
Start: 2021-01-12 | End: 2021-01-19

## 2021-03-02 ENCOUNTER — HOSPITAL ENCOUNTER (OUTPATIENT)
Dept: GENERAL RADIOLOGY | Age: 61
Discharge: HOME | End: 2021-03-02
Payer: MEDICAID

## 2021-03-02 DIAGNOSIS — M25.511 CHRONIC PAIN OF BOTH SHOULDERS: ICD-10-CM

## 2021-03-02 DIAGNOSIS — G89.29 CHRONIC PAIN OF BOTH SHOULDERS: ICD-10-CM

## 2021-03-02 DIAGNOSIS — G89.29 CHRONIC RIGHT SHOULDER PAIN: ICD-10-CM

## 2021-03-02 DIAGNOSIS — M25.512 CHRONIC PAIN OF BOTH SHOULDERS: ICD-10-CM

## 2021-03-02 DIAGNOSIS — M25.511 CHRONIC RIGHT SHOULDER PAIN: ICD-10-CM

## 2021-03-02 PROCEDURE — 73030 X-RAY EXAM OF SHOULDER: CPT

## 2021-04-06 ENCOUNTER — OFFICE VISIT (OUTPATIENT)
Dept: ORTHOPEDIC SURGERY | Age: 61
End: 2021-04-06
Payer: MEDICAID

## 2021-04-06 VITALS — BODY MASS INDEX: 32.57 KG/M2 | HEIGHT: 62 IN | WEIGHT: 177 LBS

## 2021-04-06 DIAGNOSIS — T14.8XXA FRACTURE: ICD-10-CM

## 2021-04-06 DIAGNOSIS — M19.019 SHOULDER ARTHRITIS: Primary | ICD-10-CM

## 2021-04-06 PROCEDURE — G8417 CALC BMI ABV UP PARAM F/U: HCPCS | Performed by: ORTHOPAEDIC SURGERY

## 2021-04-06 PROCEDURE — 99213 OFFICE O/P EST LOW 20 MIN: CPT | Performed by: ORTHOPAEDIC SURGERY

## 2021-04-06 PROCEDURE — G8427 DOCREV CUR MEDS BY ELIG CLIN: HCPCS | Performed by: ORTHOPAEDIC SURGERY

## 2021-04-06 RX ORDER — MELOXICAM 7.5 MG/1
7.5 TABLET ORAL DAILY
Qty: 30 TABLET | Refills: 0 | Status: SHIPPED | OUTPATIENT
Start: 2021-04-06 | End: 2021-05-06

## 2021-04-10 PROBLEM — M19.019 SHOULDER ARTHRITIS: Status: ACTIVE | Noted: 2021-04-10

## 2021-04-11 NOTE — PROGRESS NOTES
CHIEF COMPLAINT: Left shoulder pain/ arthritis. HISTORY:  Ms. Jono Salcido 64 y.o. female right handed presents today for consultation request from Abeba Nicole DO for evaluation of left shoulder pain which started 2020. She is complaining of sharp pain. Pain is increase with elevation and decrease with rest. No radiation and no numbness and tingling sensation. No other complaint. No h/o trauma or gout. Past Medical History:   Diagnosis Date    Arthritis     Asthma     Depression     Hyperlipidemia     Hypertension        Past Surgical History:   Procedure Laterality Date     SECTION      HYSTERECTOMY      LA NJX DX/THER SBST INTRLMNR CRV/THRC W/IMG GDN Bilateral 2018    BILATERAL LUMBAR FIVE TRANSFORAMINAL EPIDURAL STEROID INJECTION SITE CONFIRMED BY FLUOROSCOPY performed by Amelia Cameron MD at [facility] DX/THER SBST INTRLMNR CRV/THRC W/IMG GDN N/A 10/16/2018    RIGHT LUMBAR THREE AND LEFT LUMBAR FIVE TRANSFORAMINAL EPIDURAL STEROID INJECTION SITE CONFIRMED BY FLUOROSCOPY performed by Amelia Cameron MD at [facility] DX/THER SBST INTRLMNR CRV/THRC W/IMG GDN Right 10/30/2018    RIGHT LUMBAR THREE AND LEFT LUMBAR FIVE TRANSFORAMINAL EPIDURAL STEROID INJECTION SITE CONFIRMED BY FLUOROSCOPY performed by Amelia Cameron MD at [place] [address] Right 2020    RIGHT TOTAL HIP REPLACEMENT performed by Keeley Quiroga MD at [facility] Left 2020    LEFT TOTAL HIP REPLACEMENT WITH Helwal[employer] Kebede & NEPHEW performed by Keeley Quiroga MD at [place] [address] History     Socioeconomic History    Marital status:       Spouse name: Not on file    Number of children: 2    Years of education: Not on file    Highest education level: Not on file   Occupational History    Not on file   Social Needs    Financial resource strain: Not on file    Food insecurity     Worry: Not on file Inability: Not on file    Transportation needs     Medical: Not on file     Non-medical: Not on file   Tobacco Use    Smoking status: Former Smoker     Packs/day: 1.00     Years: 15.00     Pack years: 15.00     Quit date: 2003     Years since quittin.4    Smokeless tobacco: Never Used   Substance and Sexual Activity    Alcohol use: No     Comment: VERY OCCAISIONAL    Drug use: No    Sexual activity: Yes     Partners: Male   Lifestyle    Physical activity     Days per week: Not on file     Minutes per session: Not on file    Stress: Not on file   Relationships    Social connections     Talks on phone: Not on file     Gets together: Not on file     Attends Yazidi service: Not on file     Active member of club or organization: Not on file     Attends meetings of clubs or organizations: Not on file     Relationship status: Not on file    Intimate partner violence     Fear of current or ex partner: Not on file     Emotionally abused: Not on file     Physically abused: Not on file     Forced sexual activity: Not on file   Other Topics Concern    Not on file   Social History Narrative    Not on file       History reviewed. No pertinent family history.     Current Outpatient Medications on File Prior to Visit   Medication Sig Dispense Refill    diclofenac (VOLTAREN) 75 MG EC tablet Take 1 tablet by mouth 2 times daily (with meals) 60 tablet 0    atorvastatin (LIPITOR) 20 MG tablet Take 20 mg by mouth nightly      albuterol sulfate  (90 Base) MCG/ACT inhaler Inhale 2 puffs into the lungs every 6 hours as needed for Wheezing      cetirizine (ZYRTEC) 10 MG tablet Take 10 mg by mouth nightly       acetaminophen (TYLENOL) 325 MG tablet Take 2 tablets by mouth every 6 hours as needed for Pain 120 tablet 0    potassium chloride (KLOR-CON M) 20 MEQ extended release tablet Take 20 mEq by mouth daily      FLUoxetine (PROZAC) 20 MG capsule Take 20 mg by mouth daily      hydrochlorothiazide (HYDRODIURIL) 12.5 MG tablet Take 12.5 mg by mouth daily        No current facility-administered medications on file prior to visit. Pertinent items are noted in HPI  Review of systems reviewed from Patient History Form dated on 4/6/2021 and available in the patient's chart under the Media tab. No change noted. PHYSICAL EXAMINATION:  Ms. Alisson Godfrey is a very pleasant 64 y.o.  female who presents today in no acute distress, awake, alert, and oriented. She is well dressed, nourished and  groomed. Patient with normal affect. Height is  5' 2\" (1.575 m), weight is 177 lb (80.3 kg), Body mass index is 32.37 kg/m². Resting respiratory rate is 16. Examination of the gait, showed that the patient walks heel-toe with a non-antalgic gait and no limp. On evaluation of the left shoulder, range of motion is 90 degree in flexion and 90 degree in abduction. There is positve impingement signs. Motor and sensation is intact and symmetric throughout the bilateral upper extremities in the median, ulnar and radial nerve distributions. She has 2+ radial pulses bilaterally. IMAGING: X-rays were taken 3/2/2021, 3 views of the left shoulder, and showed no acute fracture. Moderate arthritis. IMPRESSION: Left shoulder pain/ arthritis. PLAN: I discussed with the patient the treatment options including both surgical and non-surgical treatment. We recommended stretching exercises of the shoulder was taught to the patient today. She will take NSAIDS Mobic. I believe she may benefit from cortisone injection left shoulder, but she declined to have. F/u in 6 weeks, PT if needed. She understands that this may need surgery if the pain did not to resolve. Thank you very much for the kind consultation and allowing me to participate in this patient's care. I will continue to keep you apprised of her progress.         Erin Retana MD

## (undated) DEVICE — STERILE POLYISOPRENE POWDER-FREE SURGICAL GLOVES: Brand: PROTEXIS

## (undated) DEVICE — SYRINGE 30CC LUER LOCK: Brand: CARDINAL HEALTH

## (undated) DEVICE — 3M™ COBAN™ SELF-ADHERENT WRAP, 1586S, STERILE, 6 IN X 5 YD (15 CM X 4,5 M), 12 ROLLS/CASE: Brand: 3M™ COBAN™

## (undated) DEVICE — HOOD, PEEL-AWAY: Brand: FLYTE

## (undated) DEVICE — UNIVERSAL BLOCK TRAY: Brand: MEDLINE INDUSTRIES, INC.

## (undated) DEVICE — SUTURE VCRL + SZ 0 L18IN ABSRB UD L36MM CT-1 1/2 CIR VCP840D

## (undated) DEVICE — Device

## (undated) DEVICE — DISCONTINUED USE 413460 PILLOW ABDUCTION HIP 22X15X6IN MED

## (undated) DEVICE — DECANTER: Brand: UNBRANDED

## (undated) DEVICE — SPONGE LAP W18XL18IN WHT STRUNG W/ RNG W/OUT LOOP RADPQ ST

## (undated) DEVICE — SOLUTION IV IRRIG 500ML 0.9% SODIUM CHL 2F7123

## (undated) DEVICE — SUTURE ETHBND EXCEL SZ 2 L30IN NONABSORBABLE GRN L40MM V-37 MX69G

## (undated) DEVICE — ALCOHOL RUBBING 16OZ 70% ISO

## (undated) DEVICE — COVER,TABLE,HEAVY DUTY,50"X90",STRL: Brand: MEDLINE

## (undated) DEVICE — SUTURE SURGLON SZ 1 L18IN NONABSORBABLE BLK GS 21 L37MM 1 2 8886196272

## (undated) DEVICE — MEDI-VAC NON-CONDUCTIVE SUCTION TUBING: Brand: CARDINAL HEALTH

## (undated) DEVICE — GAUZE,SPONGE,4"X4",16PLY,STRL,LF,10/TRAY: Brand: MEDLINE

## (undated) DEVICE — RESERVOIR AUTOTRANSFUSION 225/120 CC GS FILTERED XTRA

## (undated) DEVICE — TOWEL,OR,DSP,ST,BLUE,STD,4/PK,20PK/CS: Brand: MEDLINE

## (undated) DEVICE — Z INACTIVE USE 2660664 SOLUTION IRRIG 3000ML 0.9% SOD CHL USP UROMATIC PLAS CONT

## (undated) DEVICE — REFLECTION FLEXIBLE DRILL 25MM: Brand: REFLECTION

## (undated) DEVICE — NEEDLE SPNL WEISS LNG 18 GAX5 IN MOD TUOHY PT TW PERISAFE

## (undated) DEVICE — SOLUTION IV IRRIG POUR BRL 0.9% SODIUM CHL 2F7124

## (undated) DEVICE — CHLORAPREP 26ML ORANGE

## (undated) DEVICE — 3M™ COBAN™ NL STERILE NON-LATEX SELF-ADHERENT WRAP, 2084S, 4 IN X 5 YD (10 CM X 4,5 M), 18 ROLLS/CASE: Brand: 3M™ COBAN™

## (undated) DEVICE — 35 ML SYRINGE LUER-LOCK TIP: Brand: MONOJECT

## (undated) DEVICE — BLOOD TRANSFUSION FILTER: Brand: HAEMONETICS

## (undated) DEVICE — OPTIFOAM GENTLE SA, POSTOP, 4X12: Brand: MEDLINE

## (undated) DEVICE — JP 3-SPRING RES W/10FR PVC DRAIN/TR: Brand: CARDINAL HEALTH

## (undated) DEVICE — LINER SUCTION

## (undated) DEVICE — SPONGE LAP W18XL18IN WHT COT 4 PLY FLD STRUNG RADPQ DISP ST

## (undated) DEVICE — COVER,MAYO STAND,STERILE: Brand: MEDLINE

## (undated) DEVICE — SYSTEM SKIN CLSR 22CM DERMBND PRINEO

## (undated) DEVICE — Z INACTIVE NO SUPPLIER SOLUTIONIRRIG 3000ML 0.9% SOD CHL FLX CONT [79720808] [HOSPIRA WORLDWIDE INC]

## (undated) DEVICE — LIPIGUARD SB REINFUSION FILTER FOR SALVAGED BLOOD: Brand: LIPIGUARD SB

## (undated) DEVICE — REFLECTION TAPERED HOLE COVER INSERTER: Brand: REFLECTION

## (undated) DEVICE — GLOVE SURG SZ 65 THK91MIL LTX FREE SYN POLYISOPRENE

## (undated) DEVICE — Z INACTIVE USE 2582933 BAG BLD TRNSF 1 CPLR IV ST 600ML TERUFLEX

## (undated) DEVICE — SYRINGE MED 10ML LUERLOCK TIP W/O SFTY DISP

## (undated) DEVICE — SOLUTION IRRIG 2000ML 0.9% SOD CHL USP UROMATIC PLAS CONT

## (undated) DEVICE — NEEDLE EPI 18GA L3.5IN S STL MOD TUOHY PNT THN WALL W/O WNG

## (undated) DEVICE — PENCIL SMK EVAC ALL IN 1 DSGN ENH VISIBILITY IMPROVED AIR

## (undated) DEVICE — GAUZE,SPONGE,4"X4",8PLY,STRL,LF,10/TRAY: Brand: MEDLINE

## (undated) DEVICE — SMARTGOWN SURGICAL GOWN, XL: Brand: CONVERTORS

## (undated) DEVICE — NEEDLE HYPO 20GA L1.5IN YEL POLYPR HUB S STL REG BVL STR

## (undated) DEVICE — SUTURE VCRL + SZ 2-0 L18IN ABSRB UD CT1 L36MM 1/2 CIR VCP839D

## (undated) DEVICE — SMARTGOWN BREATHABLE SURGICAL GOWN: Brand: CONVERTORS

## (undated) DEVICE — SUTURE VCRL + SZ 0 L27IN ABSRB UD L36MM CT-1 1/2 CIR VCPP41D

## (undated) DEVICE — PILLOW POS W15XH6XL22IN RASPBERRY FOAM ABD W/ STRP DISP FOR

## (undated) DEVICE — AUTOTRANSFUSION BOWL SET 125 CC XTRA

## (undated) DEVICE — SYRINGE, LUER SLIP, 30ML: Brand: MEDLINE

## (undated) DEVICE — SOLUTION IV IRRIG WATER 1000ML POUR BRL 2F7114